# Patient Record
Sex: FEMALE | Race: BLACK OR AFRICAN AMERICAN | NOT HISPANIC OR LATINO | Employment: OTHER | URBAN - METROPOLITAN AREA
[De-identification: names, ages, dates, MRNs, and addresses within clinical notes are randomized per-mention and may not be internally consistent; named-entity substitution may affect disease eponyms.]

---

## 2024-05-11 ENCOUNTER — HOSPITAL ENCOUNTER (INPATIENT)
Facility: HOSPITAL | Age: 64
LOS: 2 days | Discharge: HOME | DRG: 390 | End: 2024-05-13
Attending: EMERGENCY MEDICINE | Admitting: SURGERY
Payer: MEDICAID

## 2024-05-11 DIAGNOSIS — K56.609 SMALL BOWEL OBSTRUCTION: Primary | ICD-10-CM

## 2024-05-11 DIAGNOSIS — R10.84 GENERALIZED ABDOMINAL PAIN: ICD-10-CM

## 2024-05-11 DIAGNOSIS — K56.601 COMPLETE INTESTINAL OBSTRUCTION, UNSPECIFIED CAUSE: ICD-10-CM

## 2024-05-11 PROBLEM — E78.5 HYPERLIPIDEMIA: Status: ACTIVE | Noted: 2024-05-11

## 2024-05-11 PROBLEM — K29.70 GASTRITIS: Status: ACTIVE | Noted: 2024-05-11

## 2024-05-11 PROBLEM — I10 ESSENTIAL HYPERTENSION: Status: ACTIVE | Noted: 2024-05-11

## 2024-05-11 PROBLEM — K29.70 GASTRITIS: Status: RESOLVED | Noted: 2024-05-11 | Resolved: 2024-05-11

## 2024-05-11 PROBLEM — K21.9 GERD (GASTROESOPHAGEAL REFLUX DISEASE): Status: ACTIVE | Noted: 2024-05-11

## 2024-05-11 LAB
ALBUMIN SERPL BCP-MCNC: 2.5 G/DL (ref 3.5–5.2)
ALP SERPL-CCNC: 78 U/L (ref 55–135)
ALT SERPL W/O P-5'-P-CCNC: 9 U/L (ref 10–44)
ANION GAP SERPL CALC-SCNC: 9 MMOL/L (ref 8–16)
AST SERPL-CCNC: 14 U/L (ref 10–40)
BACTERIA #/AREA URNS HPF: NORMAL /HPF
BASOPHILS # BLD AUTO: 0.04 K/UL (ref 0–0.2)
BASOPHILS NFR BLD: 1 % (ref 0–1.9)
BILIRUB SERPL-MCNC: 0.4 MG/DL (ref 0.1–1)
BILIRUB UR QL STRIP: NEGATIVE
BUN SERPL-MCNC: 19 MG/DL (ref 8–23)
CALCIUM SERPL-MCNC: 8.3 MG/DL (ref 8.7–10.5)
CHLORIDE SERPL-SCNC: 103 MMOL/L (ref 95–110)
CLARITY UR: CLEAR
CO2 SERPL-SCNC: 26 MMOL/L (ref 23–29)
COLOR UR: YELLOW
CREAT SERPL-MCNC: 0.9 MG/DL (ref 0.5–1.4)
DIFFERENTIAL METHOD BLD: ABNORMAL
EOSINOPHIL # BLD AUTO: 0 K/UL (ref 0–0.5)
EOSINOPHIL NFR BLD: 0.5 % (ref 0–8)
ERYTHROCYTE [DISTWIDTH] IN BLOOD BY AUTOMATED COUNT: 14.1 % (ref 11.5–14.5)
EST. GFR  (NO RACE VARIABLE): >60 ML/MIN/1.73 M^2
GLUCOSE SERPL-MCNC: 89 MG/DL (ref 70–110)
GLUCOSE UR QL STRIP: NEGATIVE
HCT VFR BLD AUTO: 34.7 % (ref 37–48.5)
HGB BLD-MCNC: 10.8 G/DL (ref 12–16)
HGB UR QL STRIP: NEGATIVE
HYALINE CASTS #/AREA URNS LPF: 0 /LPF
IMM GRANULOCYTES # BLD AUTO: 0.01 K/UL (ref 0–0.04)
IMM GRANULOCYTES NFR BLD AUTO: 0.2 % (ref 0–0.5)
KETONES UR QL STRIP: ABNORMAL
LACTATE SERPL-SCNC: 0.9 MMOL/L (ref 0.5–2.2)
LEUKOCYTE ESTERASE UR QL STRIP: NEGATIVE
LIPASE SERPL-CCNC: 3 U/L (ref 4–60)
LYMPHOCYTES # BLD AUTO: 1 K/UL (ref 1–4.8)
LYMPHOCYTES NFR BLD: 22.7 % (ref 18–48)
MCH RBC QN AUTO: 27.8 PG (ref 27–31)
MCHC RBC AUTO-ENTMCNC: 31.1 G/DL (ref 32–36)
MCV RBC AUTO: 89 FL (ref 82–98)
MICROSCOPIC COMMENT: NORMAL
MONOCYTES # BLD AUTO: 0.8 K/UL (ref 0.3–1)
MONOCYTES NFR BLD: 20 % (ref 4–15)
NEUTROPHILS # BLD AUTO: 2.3 K/UL (ref 1.8–7.7)
NEUTROPHILS NFR BLD: 55.6 % (ref 38–73)
NITRITE UR QL STRIP: NEGATIVE
NRBC BLD-RTO: 0 /100 WBC
PH UR STRIP: 6 [PH] (ref 5–8)
PLATELET # BLD AUTO: 581 K/UL (ref 150–450)
PMV BLD AUTO: 8.5 FL (ref 9.2–12.9)
POTASSIUM SERPL-SCNC: 3.5 MMOL/L (ref 3.5–5.1)
PROT SERPL-MCNC: 6 G/DL (ref 6–8.4)
PROT UR QL STRIP: ABNORMAL
RBC # BLD AUTO: 3.88 M/UL (ref 4–5.4)
RBC #/AREA URNS HPF: 0 /HPF (ref 0–4)
SODIUM SERPL-SCNC: 138 MMOL/L (ref 136–145)
SP GR UR STRIP: >1.03 (ref 1–1.03)
SQUAMOUS #/AREA URNS HPF: 3 /HPF
URN SPEC COLLECT METH UR: ABNORMAL
UROBILINOGEN UR STRIP-ACNC: NEGATIVE EU/DL
WBC # BLD AUTO: 4.19 K/UL (ref 3.9–12.7)
WBC #/AREA URNS HPF: 1 /HPF (ref 0–5)

## 2024-05-11 PROCEDURE — G0378 HOSPITAL OBSERVATION PER HR: HCPCS

## 2024-05-11 PROCEDURE — 85025 COMPLETE CBC W/AUTO DIFF WBC: CPT | Performed by: EMERGENCY MEDICINE

## 2024-05-11 PROCEDURE — 83605 ASSAY OF LACTIC ACID: CPT | Performed by: EMERGENCY MEDICINE

## 2024-05-11 PROCEDURE — 25000003 PHARM REV CODE 250

## 2024-05-11 PROCEDURE — 63600175 PHARM REV CODE 636 W HCPCS: Performed by: EMERGENCY MEDICINE

## 2024-05-11 PROCEDURE — 25000003 PHARM REV CODE 250: Performed by: EMERGENCY MEDICINE

## 2024-05-11 PROCEDURE — 96375 TX/PRO/DX INJ NEW DRUG ADDON: CPT

## 2024-05-11 PROCEDURE — 25000003 PHARM REV CODE 250: Performed by: SURGERY

## 2024-05-11 PROCEDURE — 96365 THER/PROPH/DIAG IV INF INIT: CPT

## 2024-05-11 PROCEDURE — 63600175 PHARM REV CODE 636 W HCPCS: Performed by: SURGERY

## 2024-05-11 PROCEDURE — 81000 URINALYSIS NONAUTO W/SCOPE: CPT | Performed by: EMERGENCY MEDICINE

## 2024-05-11 PROCEDURE — 83690 ASSAY OF LIPASE: CPT | Performed by: EMERGENCY MEDICINE

## 2024-05-11 PROCEDURE — 99222 1ST HOSP IP/OBS MODERATE 55: CPT | Mod: ,,, | Performed by: SURGERY

## 2024-05-11 PROCEDURE — 25500020 PHARM REV CODE 255: Performed by: EMERGENCY MEDICINE

## 2024-05-11 PROCEDURE — 99285 EMERGENCY DEPT VISIT HI MDM: CPT | Mod: 25

## 2024-05-11 PROCEDURE — 11000001 HC ACUTE MED/SURG PRIVATE ROOM

## 2024-05-11 PROCEDURE — 80053 COMPREHEN METABOLIC PANEL: CPT | Performed by: EMERGENCY MEDICINE

## 2024-05-11 PROCEDURE — 96361 HYDRATE IV INFUSION ADD-ON: CPT

## 2024-05-11 RX ORDER — MORPHINE SULFATE 4 MG/ML
4 INJECTION, SOLUTION INTRAMUSCULAR; INTRAVENOUS EVERY 4 HOURS PRN
Status: DISCONTINUED | OUTPATIENT
Start: 2024-05-11 | End: 2024-05-13 | Stop reason: HOSPADM

## 2024-05-11 RX ORDER — MORPHINE SULFATE 4 MG/ML
4 INJECTION, SOLUTION INTRAMUSCULAR; INTRAVENOUS
Status: COMPLETED | OUTPATIENT
Start: 2024-05-11 | End: 2024-05-11

## 2024-05-11 RX ORDER — SODIUM CHLORIDE 0.9 % (FLUSH) 0.9 %
10 SYRINGE (ML) INJECTION
Status: DISCONTINUED | OUTPATIENT
Start: 2024-05-11 | End: 2024-05-13 | Stop reason: HOSPADM

## 2024-05-11 RX ORDER — ACETAMINOPHEN 325 MG/1
650 TABLET ORAL EVERY 8 HOURS PRN
Status: DISCONTINUED | OUTPATIENT
Start: 2024-05-11 | End: 2024-05-13 | Stop reason: HOSPADM

## 2024-05-11 RX ORDER — TALC
6 POWDER (GRAM) TOPICAL NIGHTLY PRN
Status: DISCONTINUED | OUTPATIENT
Start: 2024-05-11 | End: 2024-05-13 | Stop reason: HOSPADM

## 2024-05-11 RX ORDER — HYDRALAZINE HYDROCHLORIDE 20 MG/ML
10 INJECTION INTRAMUSCULAR; INTRAVENOUS EVERY 6 HOURS PRN
Status: DISCONTINUED | OUTPATIENT
Start: 2024-05-11 | End: 2024-05-13 | Stop reason: HOSPADM

## 2024-05-11 RX ORDER — HYDROCHLOROTHIAZIDE 12.5 MG/1
12.5 TABLET ORAL DAILY
Status: DISCONTINUED | OUTPATIENT
Start: 2024-05-11 | End: 2024-05-11

## 2024-05-11 RX ORDER — ONDANSETRON HYDROCHLORIDE 2 MG/ML
4 INJECTION, SOLUTION INTRAVENOUS
Status: COMPLETED | OUTPATIENT
Start: 2024-05-11 | End: 2024-05-11

## 2024-05-11 RX ORDER — HYDROCHLOROTHIAZIDE 12.5 MG/1
12.5 TABLET ORAL DAILY
COMMUNITY
Start: 2024-04-23

## 2024-05-11 RX ORDER — SUCRALFATE 1 G/10ML
1 SUSPENSION ORAL 3 TIMES DAILY PRN
Status: DISCONTINUED | OUTPATIENT
Start: 2024-05-12 | End: 2024-05-13 | Stop reason: HOSPADM

## 2024-05-11 RX ORDER — ONDANSETRON 8 MG/1
8 TABLET, ORALLY DISINTEGRATING ORAL EVERY 6 HOURS PRN
Status: DISCONTINUED | OUTPATIENT
Start: 2024-05-11 | End: 2024-05-13 | Stop reason: HOSPADM

## 2024-05-11 RX ORDER — DEXTROSE MONOHYDRATE, SODIUM CHLORIDE, AND POTASSIUM CHLORIDE 50; 1.49; 9 G/1000ML; G/1000ML; G/1000ML
INJECTION, SOLUTION INTRAVENOUS CONTINUOUS
Status: DISCONTINUED | OUTPATIENT
Start: 2024-05-11 | End: 2024-05-13 | Stop reason: HOSPADM

## 2024-05-11 RX ORDER — MORPHINE SULFATE 4 MG/ML
2 INJECTION, SOLUTION INTRAMUSCULAR; INTRAVENOUS EVERY 4 HOURS PRN
Status: DISCONTINUED | OUTPATIENT
Start: 2024-05-11 | End: 2024-05-13 | Stop reason: HOSPADM

## 2024-05-11 RX ORDER — ACETAMINOPHEN 650 MG/1
650 SUPPOSITORY RECTAL EVERY 4 HOURS PRN
Status: DISCONTINUED | OUTPATIENT
Start: 2024-05-11 | End: 2024-05-13 | Stop reason: HOSPADM

## 2024-05-11 RX ORDER — SODIUM CHLORIDE 9 MG/ML
1000 INJECTION, SOLUTION INTRAVENOUS
Status: COMPLETED | OUTPATIENT
Start: 2024-05-11 | End: 2024-05-11

## 2024-05-11 RX ORDER — LIDOCAINE HYDROCHLORIDE 10 MG/ML
1 INJECTION, SOLUTION EPIDURAL; INFILTRATION; INTRACAUDAL; PERINEURAL ONCE AS NEEDED
Status: DISCONTINUED | OUTPATIENT
Start: 2024-05-11 | End: 2024-05-13 | Stop reason: HOSPADM

## 2024-05-11 RX ORDER — HYDROCHLOROTHIAZIDE 12.5 MG/1
12.5 TABLET ORAL DAILY
Status: DISCONTINUED | OUTPATIENT
Start: 2024-05-11 | End: 2024-05-13 | Stop reason: HOSPADM

## 2024-05-11 RX ORDER — PROMETHAZINE HYDROCHLORIDE 25 MG/1
25 SUPPOSITORY RECTAL EVERY 6 HOURS PRN
Status: DISCONTINUED | OUTPATIENT
Start: 2024-05-11 | End: 2024-05-13 | Stop reason: HOSPADM

## 2024-05-11 RX ADMIN — SODIUM CHLORIDE 1000 ML: 9 INJECTION, SOLUTION INTRAVENOUS at 08:05

## 2024-05-11 RX ADMIN — DEXTROSE, SODIUM CHLORIDE, AND POTASSIUM CHLORIDE: 5; .9; .15 INJECTION INTRAVENOUS at 12:05

## 2024-05-11 RX ADMIN — MORPHINE SULFATE 4 MG: 4 INJECTION INTRAVENOUS at 12:05

## 2024-05-11 RX ADMIN — IOHEXOL 100 ML: 350 INJECTION, SOLUTION INTRAVENOUS at 09:05

## 2024-05-11 RX ADMIN — PIPERACILLIN SODIUM AND TAZOBACTAM SODIUM 4.5 G: 4; .5 INJECTION, POWDER, FOR SOLUTION INTRAVENOUS at 11:05

## 2024-05-11 RX ADMIN — SODIUM CHLORIDE 1000 ML: 9 INJECTION, SOLUTION INTRAVENOUS at 11:05

## 2024-05-11 RX ADMIN — ONDANSETRON 8 MG: 8 TABLET, ORALLY DISINTEGRATING ORAL at 12:05

## 2024-05-11 RX ADMIN — ONDANSETRON 8 MG: 8 TABLET, ORALLY DISINTEGRATING ORAL at 09:05

## 2024-05-11 RX ADMIN — MORPHINE SULFATE 4 MG: 4 INJECTION INTRAVENOUS at 08:05

## 2024-05-11 RX ADMIN — MORPHINE SULFATE 4 MG: 4 INJECTION INTRAVENOUS at 05:05

## 2024-05-11 RX ADMIN — ONDANSETRON 4 MG: 2 INJECTION INTRAMUSCULAR; INTRAVENOUS at 08:05

## 2024-05-11 RX ADMIN — DEXTROSE, SODIUM CHLORIDE, AND POTASSIUM CHLORIDE: 5; .9; .15 INJECTION INTRAVENOUS at 11:05

## 2024-05-11 RX ADMIN — MORPHINE SULFATE 4 MG: 4 INJECTION INTRAVENOUS at 09:05

## 2024-05-11 NOTE — SUBJECTIVE & OBJECTIVE
Past Medical History:   Diagnosis Date    GERD (gastroesophageal reflux disease)        Past Surgical History:   Procedure Laterality Date    HYSTERECTOMY         Review of patient's allergies indicates:   Allergen Reactions    Naproxen Rash       No current facility-administered medications on file prior to encounter.     Current Outpatient Medications on File Prior to Encounter   Medication Sig    hydroCHLOROthiazide (HYDRODIURIL) 12.5 MG Tab Take 12.5 mg by mouth once daily.     Family History    None       Tobacco Use    Smoking status: Never    Smokeless tobacco: Not on file   Substance and Sexual Activity    Alcohol use: Not on file    Drug use: Never    Sexual activity: Not on file     Review of Systems   Constitutional:  Positive for activity change, appetite change, fatigue and fever.   HENT: Negative.     Respiratory: Negative.  Negative for shortness of breath.    Cardiovascular: Negative.  Negative for chest pain.   Gastrointestinal:  Positive for abdominal pain, diarrhea, nausea and vomiting.   Genitourinary: Negative.    Musculoskeletal:  Positive for back pain.   Skin: Negative.    Neurological: Negative.      Objective:     Vital Signs (Most Recent):  Temp: 98.2 °F (36.8 °C) (05/11/24 0742)  Pulse: 72 (05/11/24 1102)  Resp: 19 (05/11/24 1243)  BP: (!) 121/58 (05/11/24 1102)  SpO2: (!) 94 % (05/11/24 1102) Vital Signs (24h Range):  Temp:  [98.2 °F (36.8 °C)] 98.2 °F (36.8 °C)  Pulse:  [] 72  Resp:  [11-19] 19  SpO2:  [94 %-100 %] 94 %  BP: (116-175)/(58-74) 121/58     Weight: 73.2 kg (161 lb 6.4 oz)  Body mass index is 23.83 kg/m².     Physical Exam  Vitals and nursing note reviewed.   Constitutional:       General: She is not in acute distress.  HENT:      Mouth/Throat:      Mouth: Mucous membranes are moist.      Pharynx: Oropharynx is clear.   Eyes:      Pupils: Pupils are equal, round, and reactive to light.   Cardiovascular:      Rate and Rhythm: Normal rate and regular rhythm.      Heart  sounds: No murmur heard.  Pulmonary:      Effort: Pulmonary effort is normal.      Breath sounds: Normal breath sounds. No wheezing.   Abdominal:      General: Bowel sounds are normal. There is no distension.      Palpations: Abdomen is soft.      Tenderness: There is abdominal tenderness.   Musculoskeletal:         General: Normal range of motion.   Skin:     General: Skin is warm and dry.   Neurological:      General: No focal deficit present.      Mental Status: She is alert and oriented to person, place, and time.          Significant Labs: All pertinent labs within the past 24 hours have been reviewed.  Recent Lab Results         05/11/24  1047   05/11/24  0959   05/11/24  0832        Albumin     2.5       ALP     78       ALT     9       Anion Gap     9       Appearance, UA   Clear         AST     14       Bacteria, UA   None         Baso #     0.04       Basophil %     1.0       Bilirubin (UA)   Negative         BILIRUBIN TOTAL     0.4  Comment: For infants and newborns, interpretation of results should be based  on gestational age, weight and in agreement with clinical  observations.    Premature Infant recommended reference ranges:  Up to 24 hours.............<8.0 mg/dL  Up to 48 hours............<12.0 mg/dL  3-5 days..................<15.0 mg/dL  6-29 days.................<15.0 mg/dL         BUN     19       Calcium     8.3       Chloride     103       CO2     26       Color, UA   Yellow         Creatinine     0.9       Differential Method     Automated       eGFR     >60       Eos #     0.0       Eos %     0.5       Glucose     89       Glucose, UA   Negative         Gran # (ANC)     2.3       Gran %     55.6       Hematocrit     34.7       Hemoglobin     10.8       Hyaline Casts, UA   0         Immature Grans (Abs)     0.01  Comment: Mild elevation in immature granulocytes is non specific and   can be seen in a variety of conditions including stress response,   acute inflammation, trauma and pregnancy.  Correlation with other   laboratory and clinical findings is essential.         Immature Granulocytes     0.2       Ketones, UA   2+         Lactic Acid Level 0.9  Comment: Falsely low lactic acid results can be found in samples   containing >=13.0 mg/dL total bilirubin and/or >=3.5 mg/dL   direct bilirubin.             Leukocyte Esterase, UA   Negative         Lipase     3       Lymph #     1.0       Lymph %     22.7       MCH     27.8       MCHC     31.1       MCV     89       Microscopic Comment   SEE COMMENT  Comment: Other formed elements not mentioned in the report are not   present in the microscopic examination.            Mono #     0.8       Mono %     20.0       MPV     8.5       NITRITE UA   Negative         nRBC     0       Blood, UA   Negative         pH, UA   6.0         Platelet Count     581       Potassium     3.5       PROTEIN TOTAL     6.0       Protein, UA   1+  Comment: Recommend a 24 hour urine protein or a urine   protein/creatinine ratio if globulin induced proteinuria is  clinically suspected.           RBC     3.88       RBC, UA   0         RDW     14.1       Sodium     138       Specific Gravity, UA   >1.030         Specimen UA   Urine, Clean Catch         Squam Epithel, UA   3         UROBILINOGEN UA   Negative         WBC, UA   1         WBC     4.19               Significant Imaging: I have reviewed all pertinent imaging results/findings within the past 24 hours.

## 2024-05-11 NOTE — PHARMACY MED REC
"Admission Medication History     The home medication history was taken by Sy Burden.    You may go to "Admission" then "Reconcile Home Medications" tabs to review and/or act upon these items.     The home medication list has been updated by the Pharmacy department.   Please read ALL comments highlighted in yellow.   Please address this information as you see fit.    Feel free to contact us if you have any questions or require assistance.      Medications listed below were obtained from: Patient/family and Analytic software- [employer]  (Not in a hospital admission)        Sy Burden  [id number]    Current Outpatient Medications on File Prior to Encounter   Medication Sig Dispense Refill Last Dose    hydroCHLOROthiazide (HYDRODIURIL) 12.5 MG Tab Take 12.5 mg by mouth once daily.   Past Week                         .          "

## 2024-05-11 NOTE — ED PROVIDER NOTES
SCRIBE #1 NOTE: I, Shelley Mishra, am scribing for, and in the presence of, Jalyn Mayberry MD. I have scribed the entire note.       History     Chief Complaint   Patient presents with    Abdominal Pain     Pt c/o generalized abdominal pain with radiation to back. Pt c/o intermittent nausea and vomiting, threw up twice this morning, states dx with gastritis in 2017. LBM this morning states it was diarrhea.      Review of patient's allergies indicates:   Allergen Reactions    Naproxen Rash         History of Present Illness     HPI    5/11/2024, 8:07 AM  History obtained from the patient      History of Present Illness: Claudia Chris is a 63 y.o. female patient with PMHx of gastritis who presents to the Emergency Department for evaluation of abdominal pain which onset gradually 4 days ago. Pt reports pain is different from previous gastritis flare-ups. Pt reports pain is worsening. Symptoms are intermittent and moderate in severity. No mitigating or exacerbating factors reported. Associated sxs include N/V/D, SOB, and fever. Patient denies any CP, HA, congestion, cough, dysuria, neck pain, and all other sxs at this time. Prior Tx includes none. No further complaints or concerns at this time.       Arrival mode: Personal vehicle    PCP: No primary care provider on file.        Past Medical History:  Past Medical History:   Diagnosis Date    GERD (gastroesophageal reflux disease)        Past Surgical History:  Past Surgical History:   Procedure Laterality Date    HYSTERECTOMY           Family History:  No family history on file.    Social History:  Social History     Tobacco Use    Smoking status: Never    Smokeless tobacco: Not on file   Substance and Sexual Activity    Alcohol use: Not on file    Drug use: Never    Sexual activity: Not on file        Review of Systems     Review of Systems   Constitutional:  Positive for fever.   HENT:  Negative for congestion and sore throat.    Respiratory:  Positive for shortness  of breath. Negative for cough.    Cardiovascular:  Negative for chest pain.   Gastrointestinal:  Positive for abdominal pain, diarrhea, nausea and vomiting.   Genitourinary:  Negative for dysuria.   Musculoskeletal:  Negative for back pain and neck pain.   Skin:  Negative for rash.   Neurological:  Negative for weakness and headaches.   Hematological:  Does not bruise/bleed easily.   All other systems reviewed and are negative.       Physical Exam     Initial Vitals [05/11/24 0742]   BP Pulse Resp Temp SpO2   116/62 104 18 98.2 °F (36.8 °C) 99 %      MAP       --          Physical Exam  Nursing Notes and Vital Signs Reviewed.  Constitutional: Patient is in no acute distress. Well-developed and well-nourished.  Head: Atraumatic. Normocephalic.  Eyes: PERRL. EOM intact. Conjunctivae are not pale. No scleral icterus.  ENT: Mucous membranes are moist. Oropharynx is clear and symmetric.    Neck: Supple. Full ROM. No lymphadenopathy.  Cardiovascular: Regular rate. Regular rhythm. No murmurs, rubs, or gallops. Distal pulses are 2+ and symmetric.  Pulmonary/Chest: No respiratory distress. Clear to auscultation bilaterally. No wheezing or rales.  Abdominal: Soft and non-distended. Generalized abdominal tenderness.  No rebound, guarding, or rigidity.  Genitourinary: No CVA tenderness  Musculoskeletal: Moves all extremities. No obvious deformities. No edema.  Skin: Warm and dry.  Neurological:  Alert, awake, and appropriate.  Normal speech.  No acute focal neurological deficits are appreciated.  Psychiatric: Normal affect. Good eye contact. Appropriate in content.     ED Course   Critical Care    Date/Time: 5/11/2024 10:33 AM    Performed by: Jalyn Mayberry MD  Authorized by: Jalyn Mayberry MD  Direct patient critical care time: 20 minutes  Additional history critical care time: 15 minutes  Ordering / reviewing critical care time: 5 minutes  Documentation critical care time: 5 minutes  Consulting other physicians  "critical care time: 5 minutes  Total critical care time (exclusive of procedural time) : 50 minutes  Critical care time was exclusive of separately billable procedures and treating other patients and teaching time.  Critical care was necessary to treat or prevent imminent or life-threatening deterioration of the following conditions: bowel obstruction.  Critical care was time spent personally by me on the following activities: blood draw for specimens, development of treatment plan with patient or surrogate, discussions with consultants, interpretation of cardiac output measurements, evaluation of patient's response to treatment, obtaining history from patient or surrogate, examination of patient, ordering and performing treatments and interventions, ordering and review of laboratory studies, pulse oximetry, ordering and review of radiographic studies, re-evaluation of patient's condition and review of old charts.        ED Vital Signs:  Vitals:    05/11/24 0740 05/11/24 0742 05/11/24 0757 05/11/24 0830   BP:  116/62 138/65    Pulse:  104 94    Resp:  18 15 16   Temp:  98.2 °F (36.8 °C)     TempSrc:  Oral     SpO2:  99% 100%    Weight: 73.2 kg (161 lb 6.4 oz)      Height: 5' 9" (1.753 m)       05/11/24 0938 05/11/24 1002 05/11/24 1102   BP: (!) 175/74 138/63 (!) 121/58   Pulse: 76 74 72   Resp: 16 11 16   Temp:      TempSrc:      SpO2: 97% 99% (!) 94%   Weight:      Height:          Abnormal Lab Results:  Labs Reviewed   CBC W/ AUTO DIFFERENTIAL - Abnormal; Notable for the following components:       Result Value    RBC 3.88 (*)     Hemoglobin 10.8 (*)     Hematocrit 34.7 (*)     MCHC 31.1 (*)     Platelets 581 (*)     MPV 8.5 (*)     Mono % 20.0 (*)     All other components within normal limits   COMPREHENSIVE METABOLIC PANEL - Abnormal; Notable for the following components:    Calcium 8.3 (*)     Albumin 2.5 (*)     ALT 9 (*)     All other components within normal limits   LIPASE - Abnormal; Notable for the " following components:    Lipase 3 (*)     All other components within normal limits   URINALYSIS, REFLEX TO URINE CULTURE - Abnormal; Notable for the following components:    Specific Gravity, UA >1.030 (*)     Protein, UA 1+ (*)     Ketones, UA 2+ (*)     All other components within normal limits    Narrative:     Specimen Source->Urine   LACTIC ACID, PLASMA   URINALYSIS MICROSCOPIC    Narrative:     Specimen Source->Urine        All Lab Results:  Results for orders placed or performed during the hospital encounter of 05/11/24   CBC W/ AUTO DIFFERENTIAL   Result Value Ref Range    WBC 4.19 3.90 - 12.70 K/uL    RBC 3.88 (L) 4.00 - 5.40 M/uL    Hemoglobin 10.8 (L) 12.0 - 16.0 g/dL    Hematocrit 34.7 (L) 37.0 - 48.5 %    MCV 89 82 - 98 fL    MCH 27.8 27.0 - 31.0 pg    MCHC 31.1 (L) 32.0 - 36.0 g/dL    RDW 14.1 11.5 - 14.5 %    Platelets 581 (H) 150 - 450 K/uL    MPV 8.5 (L) 9.2 - 12.9 fL    Immature Granulocytes 0.2 0.0 - 0.5 %    Gran # (ANC) 2.3 1.8 - 7.7 K/uL    Immature Grans (Abs) 0.01 0.00 - 0.04 K/uL    Lymph # 1.0 1.0 - 4.8 K/uL    Mono # 0.8 0.3 - 1.0 K/uL    Eos # 0.0 0.0 - 0.5 K/uL    Baso # 0.04 0.00 - 0.20 K/uL    nRBC 0 0 /100 WBC    Gran % 55.6 38.0 - 73.0 %    Lymph % 22.7 18.0 - 48.0 %    Mono % 20.0 (H) 4.0 - 15.0 %    Eosinophil % 0.5 0.0 - 8.0 %    Basophil % 1.0 0.0 - 1.9 %    Differential Method Automated    Comp. Metabolic Panel   Result Value Ref Range    Sodium 138 136 - 145 mmol/L    Potassium 3.5 3.5 - 5.1 mmol/L    Chloride 103 95 - 110 mmol/L    CO2 26 23 - 29 mmol/L    Glucose 89 70 - 110 mg/dL    BUN 19 8 - 23 mg/dL    Creatinine 0.9 0.5 - 1.4 mg/dL    Calcium 8.3 (L) 8.7 - 10.5 mg/dL    Total Protein 6.0 6.0 - 8.4 g/dL    Albumin 2.5 (L) 3.5 - 5.2 g/dL    Total Bilirubin 0.4 0.1 - 1.0 mg/dL    Alkaline Phosphatase 78 55 - 135 U/L    AST 14 10 - 40 U/L    ALT 9 (L) 10 - 44 U/L    eGFR >60 >60 mL/min/1.73 m^2    Anion Gap 9 8 - 16 mmol/L   Lipase   Result Value Ref Range    Lipase 3 (L) 4  - 60 U/L   Urinalysis, Reflex to Urine Culture Urine, Clean Catch    Specimen: Urine   Result Value Ref Range    Specimen UA Urine, Clean Catch     Color, UA Yellow Yellow, Straw, Mariela    Appearance, UA Clear Clear    pH, UA 6.0 5.0 - 8.0    Specific Gravity, UA >1.030 (A) 1.005 - 1.030    Protein, UA 1+ (A) Negative    Glucose, UA Negative Negative    Ketones, UA 2+ (A) Negative    Bilirubin (UA) Negative Negative    Occult Blood UA Negative Negative    Nitrite, UA Negative Negative    Urobilinogen, UA Negative <2.0 EU/dL    Leukocytes, UA Negative Negative   Lactic acid, plasma   Result Value Ref Range    Lactate (Lactic Acid) 0.9 0.5 - 2.2 mmol/L   Urinalysis Microscopic   Result Value Ref Range    RBC, UA 0 0 - 4 /hpf    WBC, UA 1 0 - 5 /hpf    Bacteria None None-Occ /hpf    Squam Epithel, UA 3 /hpf    Hyaline Casts, UA 0 0-1/lpf /lpf    Microscopic Comment SEE COMMENT          Imaging Results:  Imaging Results              CT Abdomen Pelvis With IV Contrast NO Oral Contrast (Final result)  Result time 05/11/24 09:57:25      Final result by Teddy Guo MD (05/11/24 09:57:25)                   Impression:      Focal small bowel wall thickening with associated surrounding fat stranding in the lower central abdomen resulting in obstruction with upstream dilatation of the proximal small bowel.  Additional short-segment of small bowel wall thickening in the central abdomen.  Surrounding inflammatory changes including fat stranding and lymphadenopathy at these levels.    Findings relayed to Dr. Mayberry via Epic secure chat at 09:56 on 05/11/2024.      Electronically signed by: Teddy Guo  Date:    05/11/2024  Time:    09:57               Narrative:    EXAMINATION:  CT ABDOMEN PELVIS WITH IV CONTRAST    CLINICAL HISTORY:  Abdominal abscess/infection suspected;Nausea/vomiting;    TECHNIQUE:  Low dose axial images, sagittal and coronal reformations were obtained from the lung bases to the pubic symphysis  following the IV administration of 100 mL of Omnipaque 350 .  Oral contrast was not given.    All CT scans at this location are performed using dose optimization techniques including the following: Automated exposure control; adjustment of the mA and/or kv; use of iterative reconstruction technique.    COMPARISON:  None.    FINDINGS:  Liver is normal in size, contour, and attenuation.  No suspicious focal lesion.  Portal venous system is patent.    Gallbladder and biliary tree are unremarkable.  No cholelithiasis or ductal dilatation.    Spleen is normal in size and enhancement.  No focal lesion.    Pancreas is normal in size, contour, and enhancement.  No focal lesion or ductal dilatation.    Adrenal glands are unremarkable.    Kidneys are normal in size and enhancement.  No suspicious focal lesion, urolithiasis, or hydroureteronephrosis.    There is focal small bowel wall thickening with associated surrounding fat stranding in the lower central abdomen.  There is associated obstruction with upstream dilatation of the proximal small bowel.  Additional short-segment of small bowel wall thickening noted within the central abdomen with surrounding fat stranding and prominent mesenteric lymph nodes.  The distal small bowel and colon are decompressed.    Urinary bladder is unremarkable.    Uterus is surgically absent.  No significant adnexal abnormality seen.    Scattered atherosclerosis noted.  Major vessels of the abdomen and pelvis are otherwise unremarkable.    Prominent mesenteric lymph nodes noted at the level of the inflamed appearing small bowel.  No other suspicious lymphadenopathy.    No significant ascites, pneumoperitoneum, or peritoneal implant.    No acute bony abnormality.  No aggressive lytic or blastic lesion.  Mild degenerative changes noted in the spine.    Visualized lower lungs are clear.                                       No EKG ordered.            The Emergency Provider reviewed the vital signs  and test results, which are outlined above.     ED Discussion       10:36 AM: Discussed case with Dr. Dunne (Surgical Oncology). Dr. Dunne agrees with current care and management of pt and accepts admission.   Admitting Service: Surgical Oncology  Admitting Physician: Dr. Dunne  Admit to: [facility]     10:36 AM: Re-evaluated pt. I have discussed test results, shared treatment plan, and the need for admission with patient and family at bedside. Pt and family express understanding at this time and agree with all information. All questions answered. Pt and family have no further questions or concerns at this time. Pt is ready for admit.      Medical Decision Making  Notify by radiology that patient has a bowel obstruction.  Surgery has been consulted but she is actually on the floor right now dealing with a perforated bowel and will be down as soon as possible.  Patient's vitals have been stable.  She has some crampy abdominal pain but has no vomiting in the emergency room.  Will continue to monitor closely.  Zosyn ordered, maintenance fluids and patient is still NPO.    Went to notify pt about bowel obstruction and apparently patient has had 3 bowel obstruction in the last 3 years.  She was actually admitted at [facility] 2 days ago on the 9th for bowel obstruction had an NG-tube.  She reports she signed out AMA yesterday because she felt she was not getting good care.      Amount and/or Complexity of Data Reviewed  Labs: ordered. Decision-making details documented in ED Course.  Radiology: ordered. Decision-making details documented in ED Course.    Risk  Prescription drug management.  Decision regarding hospitalization.  Risk Details: Differential diagnosis;  Gastroenteritis, Bowel obstruction, Colitis, Diverticulitis, Cholecystitis, Appendicitis, Perforated bowel, Herniation, Infectious etiology, UTI, Pyelonephritis,  Biliary obstruction, kidney stone       Critical Care  Total time providing  critical care: 50 minutes                ED Medication(s):  Medications   LIDOcaine (PF) 10 mg/ml (1%) injection 10 mg (has no administration in time range)   sodium chloride 0.9% flush 10 mL (has no administration in time range)   ondansetron disintegrating tablet 8 mg (has no administration in time range)   melatonin tablet 6 mg (has no administration in time range)   acetaminophen tablet 650 mg (has no administration in time range)   dextrose 5 % and 0.9 % NaCl with KCl 20 mEq infusion (has no administration in time range)   acetaminophen suppository 650 mg (has no administration in time range)   morphine injection 2 mg (has no administration in time range)   morphine injection 4 mg (has no administration in time range)   promethazine suppository 25 mg (has no administration in time range)   sodium chloride 0.9% bolus 1,000 mL 1,000 mL (0 mLs Intravenous Stopped 5/11/24 0930)   ondansetron injection 4 mg (4 mg Intravenous Given 5/11/24 0830)   morphine injection 4 mg (4 mg Intravenous Given 5/11/24 0830)   iohexoL (OMNIPAQUE 350) injection 100 mL (100 mLs Intravenous Given 5/11/24 0926)   piperacillin-tazobactam (ZOSYN) 4.5 g in dextrose 5 % in water (D5W) 100 mL IVPB (MB+) (0 g Intravenous Stopped 5/11/24 1131)   0.9%  NaCl infusion (1,000 mLs Intravenous New Bag 5/11/24 1100)       New Prescriptions    No medications on file               Scribe Attestation:   Scribe #1: I performed the above scribed service and the documentation accurately describes the services I performed. I attest to the accuracy of the note.     Attending:   Physician Attestation Statement for Scribe #1: I, Jalyn Mayberry MD, personally performed the services described in this documentation, as scribed by Shelley Mishra, in my presence, and it is both accurate and complete.           Clinical Impression       ICD-10-CM ICD-9-CM   1. Generalized abdominal pain  R10.84 789.07   2. Complete intestinal obstruction, unspecified cause  K56.601  560.9   3. Small bowel obstruction  K56.609 560.9       Disposition:   Disposition: Placed in Observation  Condition: Serious         Jalyn Mayberry MD  05/11/24 8407

## 2024-05-11 NOTE — PLAN OF CARE
Discussed poc with pt, pt verbalized understanding    Purposeful rounding every 2hours    VS wnl  Fall precautions in place, remains injury free  Pt denies c/o nausea  Pain  under control with PRN meds    IVFs- D5 NS K 20mEq @100mL  Accurate I&Os  Bed locked at lowest position  Call light within reach    Chart check complete  Will cont with POC
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
## 2024-05-11 NOTE — ASSESSMENT & PLAN NOTE
CT abdomen reveal bowel obstruction   Patient NPO   NGT ordered per primary team   IV Abx- Zosyn   Gen surgery- Dr. Dunne

## 2024-05-11 NOTE — ED NOTES
Pt refuses NGT placement. Educated pt on the importance of the NGT, pt still refused. Pt is requesting to see the MD. MD Uzair notified.

## 2024-05-11 NOTE — ASSESSMENT & PLAN NOTE
Chronic, uncontrolled. Latest blood pressure and vitals reviewed-     Temp:  [98.2 °F (36.8 °C)]   Pulse:  []   Resp:  [11-19]   BP: (116-175)/(58-74)   SpO2:  [94 %-100 %] .   Home meds for hypertension were reviewed and noted below.   Hypertension Medications               hydroCHLOROthiazide (HYDRODIURIL) 12.5 MG Tab Take 12.5 mg by mouth once daily.            While in the hospital, will manage blood pressure as follows; Continue home antihypertensive regimen    Will utilize p.r.n. blood pressure medication only if patient's blood pressure greater than 180/110 and she develops symptoms such as worsening chest pain or shortness of breath.    Patient reports she only take HCTZ 12.5mg PO daily for HTN, states she hs not been taking the Lisinopril

## 2024-05-12 PROCEDURE — 63600175 PHARM REV CODE 636 W HCPCS: Performed by: SURGERY

## 2024-05-12 PROCEDURE — 25500020 PHARM REV CODE 255: Performed by: SURGERY

## 2024-05-12 PROCEDURE — 11000001 HC ACUTE MED/SURG PRIVATE ROOM

## 2024-05-12 PROCEDURE — 99232 SBSQ HOSP IP/OBS MODERATE 35: CPT | Mod: ,,, | Performed by: SURGERY

## 2024-05-12 PROCEDURE — 25000003 PHARM REV CODE 250: Performed by: SURGERY

## 2024-05-12 RX ADMIN — DEXTROSE, SODIUM CHLORIDE, AND POTASSIUM CHLORIDE: 5; .9; .15 INJECTION INTRAVENOUS at 10:05

## 2024-05-12 RX ADMIN — DIATRIZOATE MEGLUMINE AND DIATRIZOATE SODIUM 120 ML: 600; 100 SOLUTION ORAL; RECTAL at 01:05

## 2024-05-12 RX ADMIN — MORPHINE SULFATE 4 MG: 4 INJECTION INTRAVENOUS at 02:05
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
## 2024-05-12 NOTE — ASSESSMENT & PLAN NOTE
We had a lengthy discussion about the management of small-bowel obstructions namely the role for NG tube decompression.  She currently does not want an NG-tube and has had them several times before.  I did discuss with her that there are some concerning findings on her CT scan and then she has had 3 bowel obstructions this year so far all of which I do think would merit diagnostic laparoscopy at least.  At this time she has not interested in surgery and I think it is reasonable to hold off on that pending results of a small-bowel follow-through.  I discussed with her that small-bowel follow-through was normal we should we could certainly discuss an operation or not.  However if it is abnormal I certainly think it merits diagnostic laparoscopy on Monday.     -- NPO + IVF  -- SBFT tomorrow  -- pain control   -- Appreciate [facility] assistance w/ medical management of co morbidities

## 2024-05-12 NOTE — SUBJECTIVE & OBJECTIVE
Interval History: AFVSS.  PRIYA.  Passing flatus and having bowel function.  Feeling well.     Medications:  Continuous Infusions:   dextrose 5 % and 0.9 % NaCl with KCl 20 mEq   Intravenous Continuous 100 mL/hr at 05/12/24 1044 New Bag at 05/12/24 1044     Scheduled Meds:   hydroCHLOROthiazide  12.5 mg Oral Daily     PRN Meds:  Current Facility-Administered Medications:     acetaminophen, 650 mg, Rectal, Q4H PRN    acetaminophen, 650 mg, Oral, Q8H PRN    hydrALAZINE, 10 mg, Intravenous, Q6H PRN    LIDOcaine (PF) 10 mg/ml (1%), 1 mL, Intradermal, Once PRN    melatonin, 6 mg, Oral, Nightly PRN    morphine, 2 mg, Intravenous, Q4H PRN    morphine, 4 mg, Intravenous, Q4H PRN    ondansetron, 8 mg, Oral, Q6H PRN    promethazine, 25 mg, Rectal, Q6H PRN    sodium chloride 0.9%, 10 mL, Intravenous, PRN    sucralfate, 1 g, Oral, TID PRN     Review of patient's allergies indicates:   Allergen Reactions    Naproxen Rash     Objective:     Vital Signs (Most Recent):  Temp: 98.2 °F (36.8 °C) (05/12/24 1137)  Pulse: (!) 59 (05/12/24 1137)  Resp: 18 (05/12/24 1137)  BP: (!) 165/77 (05/12/24 1137)  SpO2: 99 % (05/12/24 1137) Vital Signs (24h Range):  Temp:  [96.6 °F (35.9 °C)-98.2 °F (36.8 °C)] 98.2 °F (36.8 °C)  Pulse:  [54-64] 59  Resp:  [16-18] 18  SpO2:  [98 %-100 %] 99 %  BP: (103-165)/(52-77) 165/77     Weight: 67.5 kg (148 lb 13 oz)  Body mass index is 21.98 kg/m².    Intake/Output - Last 3 Shifts         05/10 0700  05/11 0659 05/11 0700  05/12 0659 05/12 0700  05/13 0659    P.O.  0 120    I.V. (mL/kg)  1554.3 (21.2)     IV Piggyback  1094.1     Total Intake(mL/kg)  2648.4 (36.2) 120 (1.8)    Net  +2648.4 +120                    Physical Exam  Constitutional:       General: She is not in acute distress.     Appearance: Normal appearance.   HENT:      Head: Normocephalic and atraumatic.   Eyes:      Extraocular Movements: Extraocular movements intact.      Conjunctiva/sclera: Conjunctivae normal.   Cardiovascular:      Rate and  Rhythm: Normal rate.   Pulmonary:      Effort: Pulmonary effort is normal.   Abdominal:      General: Abdomen is flat. There is no distension.      Palpations: Abdomen is soft. There is no mass.      Tenderness: There is no abdominal tenderness. There is no guarding or rebound.      Hernia: No hernia is present.   Musculoskeletal:         General: No swelling, tenderness, deformity or signs of injury. Normal range of motion.   Skin:     General: Skin is warm and dry.      Coloration: Skin is not jaundiced.   Neurological:      General: No focal deficit present.      Mental Status: She is alert and oriented to person, place, and time.   Psychiatric:         Mood and Affect: Mood normal.         Behavior: Behavior normal.         Thought Content: Thought content normal.          Significant Labs:  I have reviewed all pertinent lab results within the past 24 hours.  CBC:   Recent Labs   Lab 05/11/24  0832   WBC 4.19   RBC 3.88*   HGB 10.8*   HCT 34.7*   *   MCV 89   MCH 27.8   MCHC 31.1*     BMP:   Recent Labs   Lab 05/11/24  0832   GLU 89      K 3.5      CO2 26   BUN 19   CREATININE 0.9   CALCIUM 8.3*       Significant Diagnostics:  I have reviewed all pertinent imaging results/findings within the past 24 hours.  Imaging Results              CT Abdomen Pelvis With IV Contrast NO Oral Contrast (Final result)  Result time 05/11/24 09:57:25      Final result by Teddy Guo MD (05/11/24 09:57:25)                   Impression:      Focal small bowel wall thickening with associated surrounding fat stranding in the lower central abdomen resulting in obstruction with upstream dilatation of the proximal small bowel.  Additional short-segment of small bowel wall thickening in the central abdomen.  Surrounding inflammatory changes including fat stranding and lymphadenopathy at these levels.    Findings relayed to Dr. Mayberry via Epic secure chat at 09:56 on 05/11/2024.      Electronically signed  by: Teddy Guo  Date:    05/11/2024  Time:    09:57               Narrative:    EXAMINATION:  CT ABDOMEN PELVIS WITH IV CONTRAST    CLINICAL HISTORY:  Abdominal abscess/infection suspected;Nausea/vomiting;    TECHNIQUE:  Low dose axial images, sagittal and coronal reformations were obtained from the lung bases to the pubic symphysis following the IV administration of 100 mL of Omnipaque 350 .  Oral contrast was not given.    All CT scans at this location are performed using dose optimization techniques including the following: Automated exposure control; adjustment of the mA and/or kv; use of iterative reconstruction technique.    COMPARISON:  None.    FINDINGS:  Liver is normal in size, contour, and attenuation.  No suspicious focal lesion.  Portal venous system is patent.    Gallbladder and biliary tree are unremarkable.  No cholelithiasis or ductal dilatation.    Spleen is normal in size and enhancement.  No focal lesion.    Pancreas is normal in size, contour, and enhancement.  No focal lesion or ductal dilatation.    Adrenal glands are unremarkable.    Kidneys are normal in size and enhancement.  No suspicious focal lesion, urolithiasis, or hydroureteronephrosis.    There is focal small bowel wall thickening with associated surrounding fat stranding in the lower central abdomen.  There is associated obstruction with upstream dilatation of the proximal small bowel.  Additional short-segment of small bowel wall thickening noted within the central abdomen with surrounding fat stranding and prominent mesenteric lymph nodes.  The distal small bowel and colon are decompressed.    Urinary bladder is unremarkable.    Uterus is surgically absent.  No significant adnexal abnormality seen.    Scattered atherosclerosis noted.  Major vessels of the abdomen and pelvis are otherwise unremarkable.    Prominent mesenteric lymph nodes noted at the level of the inflamed appearing small bowel.  No other suspicious  lymphadenopathy.    No significant ascites, pneumoperitoneum, or peritoneal implant.    No acute bony abnormality.  No aggressive lytic or blastic lesion.  Mild degenerative changes noted in the spine.    Visualized lower lungs are clear.
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
## 2024-05-12 NOTE — PLAN OF CARE
[facility] - Med Surg  Discharge Assessment    Primary Care Provider: No, Primary Doctor     Discharge Assessment (most recent)       BRIEF DISCHARGE ASSESSMENT - 05/12/24 1610          Discharge Planning    Assessment Type Discharge Planning Brief Assessment     Resource/Environmental Concerns none     Support Systems Children     Equipment Currently Used at Home none     Current Living Arrangements home     Patient/Family Anticipates Transition to home     Patient/Family Anticipated Services at Transition none     DME Needed Upon Discharge  none     Discharge Plan A Home                     Independent with adls.  No anticipated needs.

## 2024-05-12 NOTE — ASSESSMENT & PLAN NOTE
CT abdomen reveal bowel obstruction   Patient NPO   NGT ordered per primary team -patient refused  IV Abx- Zosyn   Gen surgery- Dr. Dunne

## 2024-05-12 NOTE — ASSESSMENT & PLAN NOTE
Chronic, uncontrolled. Latest blood pressure and vitals reviewed-     Temp:  [96.6 °F (35.9 °C)-98.2 °F (36.8 °C)]   Pulse:  [54-64]   Resp:  [16-18]   BP: (103-165)/(52-77)   SpO2:  [98 %-100 %] .   Home meds for hypertension were reviewed and noted below.   Hypertension Medications               hydroCHLOROthiazide (HYDRODIURIL) 12.5 MG Tab Take 12.5 mg by mouth once daily.            While in the hospital, will manage blood pressure as follows; Continue home antihypertensive regimen    Will utilize p.r.n. blood pressure medication only if patient's blood pressure greater than 180/110 and she develops symptoms such as worsening chest pain or shortness of breath.    Patient reports she only take HCTZ 12.5mg PO daily for HTN, states she hs not been taking the Lisinopril

## 2024-05-12 NOTE — ASSESSMENT & PLAN NOTE
Small-bowel follow-through today still suggestive of bowel obstruction.  Discussed these results with the patient who states that she is actually feeling a.  I did discuss with her the option for proceeding with diagnostic laparoscopy and possible surgery tomorrow versus attempting a clear liquid diet since she is still having bowel function.  She would like to be allowed to drink today and get a repeat x-ray tomorrow.  I did discuss with her that should she develop any nausea or vomiting the recommendation would be to proceed with surgery as there was still evidence on the imaging of obstruction.  She is very reluctant to undergo surgery, likely in part because she is feeling so well.    -- trial CLD today  -- repeat KUB in the AM to eval contrast passage  -- may need diagnostic laparoscopy still but will see how she does  -- pain control   -- Appreciate [facility] assistance w/ medical management of co morbidities
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
## 2024-05-12 NOTE — SUBJECTIVE & OBJECTIVE
Interval History:  Follow up for small-bowel obstruction.  Patient denies any abdominal pain at this time.  She states she is feeling well this morning.  Awaiting evaluation by Dr. Dunne for further management.  Patient states she is passing gas but has not had any bowel movements.    Review of Systems  Objective:     Vital Signs (Most Recent):  Temp: 98.2 °F (36.8 °C) (05/12/24 1137)  Pulse: (!) 59 (05/12/24 1137)  Resp: 18 (05/12/24 1137)  BP: (!) 165/77 (05/12/24 1137)  SpO2: 99 % (05/12/24 1137) Vital Signs (24h Range):  Temp:  [96.6 °F (35.9 °C)-98.2 °F (36.8 °C)] 98.2 °F (36.8 °C)  Pulse:  [54-64] 59  Resp:  [16-18] 18  SpO2:  [98 %-100 %] 99 %  BP: (103-165)/(52-77) 165/77     Weight: 67.5 kg (148 lb 13 oz)  Body mass index is 21.98 kg/m².    Intake/Output Summary (Last 24 hours) at 5/12/2024 1329  Last data filed at 5/12/2024 1038  Gross per 24 hour   Intake 1554.26 ml   Output --   Net 1554.26 ml         Physical Exam  Vitals and nursing note reviewed.   Constitutional:       Appearance: Normal appearance.   HENT:      Head: Normocephalic and atraumatic.      Nose: Nose normal.      Mouth/Throat:      Mouth: Mucous membranes are moist.   Eyes:      Extraocular Movements: Extraocular movements intact.      Conjunctiva/sclera: Conjunctivae normal.   Cardiovascular:      Rate and Rhythm: Normal rate and regular rhythm.      Pulses: Normal pulses.      Heart sounds: Normal heart sounds.   Pulmonary:      Effort: Pulmonary effort is normal.      Breath sounds: Normal breath sounds.   Abdominal:      General: Abdomen is flat.      Palpations: Abdomen is soft.      Comments: Positive hypoactive bowel sounds   Musculoskeletal:      Cervical back: Normal range of motion and neck supple.   Skin:     General: Skin is warm.      Capillary Refill: Capillary refill takes less than 2 seconds.   Neurological:      Mental Status: She is alert and oriented to person, place, and time. Mental status is at baseline.    Psychiatric:         Mood and Affect: Mood normal.         Behavior: Behavior normal.             Significant Labs: All pertinent labs within the past 24 hours have been reviewed.  Recent Lab Results       None            Significant Imaging:   CT Abdomen Pelvis With IV Contrast NO Oral Contrast   Final Result      Focal small bowel wall thickening with associated surrounding fat stranding in the lower central abdomen resulting in obstruction with upstream dilatation of the proximal small bowel.  Additional short-segment of small bowel wall thickening in the central abdomen.  Surrounding inflammatory changes including fat stranding and lymphadenopathy at these levels.      Findings relayed to Dr. Mayberry via Epic secure chat at 09:56 on 05/11/2024.         Electronically signed by: Teddy Guo   Date:    05/11/2024   Time:    09:57      FL Small Bowel Follow Through    (Results Pending)

## 2024-05-12 NOTE — PLAN OF CARE
Problem: Adult Inpatient Plan of Care  Goal: Plan of Care Review  Outcome: Progressing  Goal: Patient-Specific Goal (Individualized)  Outcome: Progressing  Goal: Absence of Hospital-Acquired Illness or Injury  Outcome: Progressing  Goal: Optimal Comfort and Wellbeing  Outcome: Progressing  Goal: Readiness for Transition of Care  Outcome: Progressing       Patient remained free of falls/injury/trauma throughout shift. Patient AAOx4. Neuro status unchanged. No complaints of chest pain or SOB. [id number] continues to infuse. Patient continues to complain of abdominal pain. PRN Morpine administered for pain. Fall risk precautions reviewed and maintained with patient. POC reviewed with patient. Patient verbalized understanding.

## 2024-05-12 NOTE — SUBJECTIVE & OBJECTIVE
No current facility-administered medications on file prior to encounter.     Current Outpatient Medications on File Prior to Encounter   Medication Sig    hydroCHLOROthiazide (HYDRODIURIL) 12.5 MG Tab Take 12.5 mg by mouth once daily.       Review of patient's allergies indicates:   Allergen Reactions    Naproxen Rash       Past Medical History:   Diagnosis Date    GERD (gastroesophageal reflux disease)      Past Surgical History:   Procedure Laterality Date    HYSTERECTOMY       Family History    None       Tobacco Use    Smoking status: Never    Smokeless tobacco: Not on file   Substance and Sexual Activity    Alcohol use: Not on file    Drug use: Never    Sexual activity: Not on file     Review of Systems   Constitutional:  Positive for appetite change. Negative for activity change, chills, fatigue and fever.   Respiratory:  Negative for chest tightness and shortness of breath.    Cardiovascular:  Negative for chest pain.   Gastrointestinal:  Positive for abdominal distention, abdominal pain and nausea. Negative for anal bleeding, blood in stool, constipation, diarrhea and vomiting.   Skin:  Negative for color change and pallor.   Neurological:  Negative for dizziness, seizures, facial asymmetry, numbness and headaches.   Psychiatric/Behavioral:  Negative for agitation and hallucinations. The patient is not nervous/anxious.      Objective:     Vital Signs (Most Recent):  Temp: 97 °F (36.1 °C) (05/11/24 1558)  Pulse: 64 (05/11/24 1558)  Resp: 18 (05/11/24 1705)  BP: (!) 143/64 (05/11/24 1558)  SpO2: 100 % (05/11/24 1558) Vital Signs (24h Range):  Temp:  [97 °F (36.1 °C)-98.2 °F (36.8 °C)] 97 °F (36.1 °C)  Pulse:  [] 64  Resp:  [11-19] 18  SpO2:  [94 %-100 %] 100 %  BP: (116-175)/(58-74) 143/64     Weight: 73.2 kg (161 lb 6.4 oz)  Body mass index is 23.83 kg/m².     Physical Exam  Constitutional:       General: She is not in acute distress.     Appearance: Normal appearance.   HENT:      Head: Normocephalic  and atraumatic.   Eyes:      Extraocular Movements: Extraocular movements intact.      Conjunctiva/sclera: Conjunctivae normal.   Cardiovascular:      Rate and Rhythm: Normal rate.   Pulmonary:      Effort: Pulmonary effort is normal.   Abdominal:      General: Abdomen is flat. There is no distension.      Palpations: Abdomen is soft. There is no mass.      Tenderness: There is no abdominal tenderness. There is no guarding or rebound.      Hernia: No hernia is present.   Musculoskeletal:         General: No swelling, tenderness, deformity or signs of injury. Normal range of motion.   Skin:     General: Skin is warm and dry.      Coloration: Skin is not jaundiced.   Neurological:      General: No focal deficit present.      Mental Status: She is alert and oriented to person, place, and time.   Psychiatric:         Mood and Affect: Mood normal.         Behavior: Behavior normal.         Thought Content: Thought content normal.            I have reviewed all pertinent lab results within the past 24 hours.  CBC:   Recent Labs   Lab 05/11/24  0832   WBC 4.19   RBC 3.88*   HGB 10.8*   HCT 34.7*   *   MCV 89   MCH 27.8   MCHC 31.1*     BMP:   Recent Labs   Lab 05/11/24  0832   GLU 89      K 3.5      CO2 26   BUN 19   CREATININE 0.9   CALCIUM 8.3*       Significant Diagnostics:  I have reviewed all pertinent imaging results/findings within the past 24 hours.  CT: I have reviewed all pertinent results/findings within the past 24 hours and my personal findings are:  Air-fluid levels and fat stranding consistent with small-bowel obstruction  Imaging Results              CT Abdomen Pelvis With IV Contrast NO Oral Contrast (Final result)  Result time 05/11/24 09:57:25      Final result by Teddy Guo MD (05/11/24 09:57:25)                   Impression:      Focal small bowel wall thickening with associated surrounding fat stranding in the lower central abdomen resulting in obstruction with upstream  dilatation of the proximal small bowel.  Additional short-segment of small bowel wall thickening in the central abdomen.  Surrounding inflammatory changes including fat stranding and lymphadenopathy at these levels.    Findings relayed to Dr. Mayberry via Epic secure chat at 09:56 on 05/11/2024.      Electronically signed by: Teddy Guo  Date:    05/11/2024  Time:    09:57               Narrative:    EXAMINATION:  CT ABDOMEN PELVIS WITH IV CONTRAST    CLINICAL HISTORY:  Abdominal abscess/infection suspected;Nausea/vomiting;    TECHNIQUE:  Low dose axial images, sagittal and coronal reformations were obtained from the lung bases to the pubic symphysis following the IV administration of 100 mL of Omnipaque 350 .  Oral contrast was not given.    All CT scans at this location are performed using dose optimization techniques including the following: Automated exposure control; adjustment of the mA and/or kv; use of iterative reconstruction technique.    COMPARISON:  None.    FINDINGS:  Liver is normal in size, contour, and attenuation.  No suspicious focal lesion.  Portal venous system is patent.    Gallbladder and biliary tree are unremarkable.  No cholelithiasis or ductal dilatation.    Spleen is normal in size and enhancement.  No focal lesion.    Pancreas is normal in size, contour, and enhancement.  No focal lesion or ductal dilatation.    Adrenal glands are unremarkable.    Kidneys are normal in size and enhancement.  No suspicious focal lesion, urolithiasis, or hydroureteronephrosis.    There is focal small bowel wall thickening with associated surrounding fat stranding in the lower central abdomen.  There is associated obstruction with upstream dilatation of the proximal small bowel.  Additional short-segment of small bowel wall thickening noted within the central abdomen with surrounding fat stranding and prominent mesenteric lymph nodes.  The distal small bowel and colon are decompressed.    Urinary bladder is  unremarkable.    Uterus is surgically absent.  No significant adnexal abnormality seen.    Scattered atherosclerosis noted.  Major vessels of the abdomen and pelvis are otherwise unremarkable.    Prominent mesenteric lymph nodes noted at the level of the inflamed appearing small bowel.  No other suspicious lymphadenopathy.    No significant ascites, pneumoperitoneum, or peritoneal implant.    No acute bony abnormality.  No aggressive lytic or blastic lesion.  Mild degenerative changes noted in the spine.    Visualized lower lungs are clear.

## 2024-05-13 VITALS
HEART RATE: 68 BPM | DIASTOLIC BLOOD PRESSURE: 62 MMHG | WEIGHT: 148.81 LBS | OXYGEN SATURATION: 97 % | RESPIRATION RATE: 18 BRPM | BODY MASS INDEX: 22.04 KG/M2 | TEMPERATURE: 97 F | SYSTOLIC BLOOD PRESSURE: 139 MMHG | HEIGHT: 69 IN

## 2024-05-13 PROCEDURE — 99238 HOSP IP/OBS DSCHRG MGMT 30/<: CPT | Mod: ,,, | Performed by: SURGERY

## 2024-05-13 NOTE — HOSPITAL COURSE
5/11/2024:  presented w/ abdominal pain, nausea and vomiting.  CT scan c/f SBO.  Pt declined NGT  5/12/2024:  pt states having bowel function.  SBFT obtained showing concerns for persistent SBO.  Pt states she feels well.  We discussed surgery vs trial of clears, and pt elected for trial CLD.   5/13/2024:  pt tolerating CLD.  AM KUB with contrast completely passed and normal caliber small bowel, consistent with resolution of obstruction.  Pt desiring discharge home and appropriate for d/c.  Discussed warning signs and need for return to hospital.

## 2024-05-13 NOTE — PLAN OF CARE
[facility]  Discharge Final Note    Primary Care Provider: No, Primary Doctor    Expected Discharge Date: 5/13/2024    Final Discharge Note (most recent)       Final Note - 05/13/24 1056          Final Note    Assessment Type Final Discharge Note     Anticipated Discharge Disposition Home or Self Care        Post-Acute Status    Discharge Delays None known at this time                     Important Message from Medicare         Discharge home, no home health or dme orders noted.
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
## 2024-05-13 NOTE — PLAN OF CARE
Discussed plan of care with patient and this patient, verbalized understanding.  Patient remains free from injury.  Safety and comfort precautions maintained this shift.   Call light and personal belongings within reach, bed in low position with bed wheels locked.  No s/s of acute distress.   Purposeful rounding continued this shift.  Pain levels are controlled per MD order. IVF infusing.  Cardiac monitoring in place,   Diet orders continued,  Vital signs continued per order.  Patient mobility status remains independent   Chart and orders review completed.   Patient education about care completed.     Problem: Adult Inpatient Plan of Care  Goal: Plan of Care Review  Outcome: Progressing  Goal: Patient-Specific Goal (Individualized)  Outcome: Progressing  Goal: Absence of Hospital-Acquired Illness or Injury  Outcome: Progressing  Goal: Optimal Comfort and Wellbeing  Outcome: Progressing  Goal: Readiness for Transition of Care  Outcome: Progressing

## 2024-05-13 NOTE — NURSING
This patient IV was taken  out by herself. Patient is refusing to have another IV access placed.  Physician informed of this situation and was told to inform the patient the need of fluids. This patient stated that she does not need any fluids she came for her stomach and that's fine so she wants to leave first thing in the am. Patient says fluids is not the focus with her. Staff nurse stress the importance of having IV fluids and the cons of not having fluids and the patient still refused. MD informed.

## 2024-06-20 ENCOUNTER — HOSPITAL ENCOUNTER (EMERGENCY)
Facility: HOSPITAL | Age: 64
Discharge: HOME | End: 2024-06-20
Attending: EMERGENCY MEDICINE
Payer: MEDICAID

## 2024-06-20 VITALS
BODY MASS INDEX: 21.98 KG/M2 | OXYGEN SATURATION: 100 % | RESPIRATION RATE: 18 BRPM | WEIGHT: 148.81 LBS | HEART RATE: 84 BPM | TEMPERATURE: 98 F | DIASTOLIC BLOOD PRESSURE: 70 MMHG | SYSTOLIC BLOOD PRESSURE: 134 MMHG

## 2024-06-20 DIAGNOSIS — S52.502A CLOSED FRACTURE OF DISTAL END OF LEFT RADIUS, UNSPECIFIED FRACTURE MORPHOLOGY, INITIAL ENCOUNTER: Primary | ICD-10-CM
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
## 2024-06-20 PROCEDURE — 29105 APPLICATION LONG ARM SPLINT: CPT | Mod: LT

## 2024-06-20 PROCEDURE — 99283 EMERGENCY DEPT VISIT LOW MDM: CPT | Mod: 25

## 2024-06-20 RX ORDER — HYDROCODONE BITARTRATE AND ACETAMINOPHEN 5; 325 MG/1; MG/1
1 TABLET ORAL EVERY 4 HOURS PRN
Qty: 10 TABLET | Refills: 0 | Status: SHIPPED | OUTPATIENT
Start: 2024-06-20

## 2024-06-20 NOTE — ED PROVIDER NOTES
History      Chief Complaint   Patient presents with    Fall     Fell yesterday while walking to her door. Pt unknown why she fell. 4th fall since April 1st. L Wrist pain/ forearm pain        Review of patient's allergies indicates:   Allergen Reactions    Naproxen Rash        HPI   HPI    6/20/2024, 4:35 PM   History obtained from the patient      History of Present Illness: Claudia Chris is a 63 y.o. female patient who presents to the Emergency Department for left wrist pain since fall yesterday.    No further complaints or concerns at this time.           PCP: No, Primary Doctor       Past Medical History:  Past Medical History:   Diagnosis Date    GERD (gastroesophageal reflux disease)          Past Surgical History:  Past Surgical History:   Procedure Laterality Date    HYSTERECTOMY             Family History:  No family history on file.        Social History:  Social History     Tobacco Use    Smoking status: Never    Smokeless tobacco: Not on file   Substance and Sexual Activity    Alcohol use: Not on file    Drug use: Never    Sexual activity: Not on file       ROS     Review of Systems   Musculoskeletal:  Positive for arthralgias.   Neurological:  Negative for syncope.       Physical Exam      Initial Vitals [06/20/24 1003]   BP Pulse Resp Temp SpO2   137/64 90 18 97.6 °F (36.4 °C) 99 %      MAP       --         Physical Exam  Vital signs and nursing notes reviewed.  Constitutional: Patient is in NAD. Awake and alert. Well-developed and well-nourished.  Head: Atraumatic. Normocephalic.  Eyes:  EOM intact. Conjunctivae nl. No scleral icterus.  ENT: Mucous membranes are moist.   Neck: Supple.  No meningismus  Cardiovascular: Regular rate and rhythm. No murmurs, rubs, or gallops.   Pulmonary/Chest: No respiratory distress. Clear to auscultation bilaterally. No wheezing, rales, or rhonchi.  Abdominal: Soft. Non-distended. No TTP. No rebound, guarding, or rigidity. Good bowel sounds.  Genitourinary: No  CVA tenderness  Musculoskeletal: Moves all extremities. No edema. Left wrist diffuse tenderness, no snuff ttp.  FROM.  Normal sensation, and cap refill less than 2, to fingers x 5.   Skin: Warm and dry.  Neurological: Awake and alert. No acute focal neurological deficits are appreciated.  Psychiatric: Normal affect. Good eye contact. Appropriate in content.      ED Course          Splint Application    Date/Time: 6/20/2024 4:39 PM    Performed by: Ravindra Martinez, Patient Care Assistant  Authorized by: Elsa Jenkins MD  Location details: left wrist  Splint type: sugar tong  Supplies used: Ortho-Glass  Post-procedure: The splinted body part was neurovascularly unchanged following the procedure.  Patient tolerance: Patient tolerated the procedure well with no immediate complications        ED Vital Signs:  Vitals:    06/20/24 1003 06/20/24 1004 06/20/24 1214   BP: 137/64  134/70   Pulse: 90  84   Resp: 18  18   Temp: 97.6 °F (36.4 °C)     TempSrc: Oral     SpO2: 99%  100%   Weight:  67.5 kg (148 lb 13 oz)                  Imaging Results:  Imaging Results              X-Ray Wrist Complete Left (Final result)  Result time 06/20/24 11:00:59      Final result by Guevara Nolasco MD (06/20/24 11:00:59)                   Impression:      Probable nondisplaced distal left radial metaphyseal fracture.      Electronically signed by: Guevara Nolasco MD  Date:    06/20/2024  Time:    11:00               Narrative:    EXAMINATION:  XR WRIST COMPLETE 3 VIEWS LEFT    CLINICAL HISTORY:  Unspecified fall, initial encounter    TECHNIQUE:  Standard radiography performed.    COMPARISON:  None    FINDINGS:  There are subtle findings concerning for an occult nondisplaced fracture of the distal left radial metaphysis.    There are minor degenerative changes of the radiocarpal, intercarpal and 1st metacarpophalangeal joints.                                         The Emergency Provider reviewed the vital signs and test results,  which are outlined above.    ED Discussion             Medication(s) given in the ER:  Medications - No data to display         Follow-up Information       Clinic, [facility]. Schedule an appointment as soon as possible for a visit in 1 week.    Contact information:  [address] [place] [address] [address]  [place] [address] [phone]                                    Medication List        START taking these medications      HYDROcodone-acetaminophen 5-325 mg per tablet  Commonly known as: NORCO  Take 1 tablet by mouth every 4 (four) hours as needed for Pain.            ASK your doctor about these medications      hydroCHLOROthiazide 12.5 MG Tab  Commonly known as: HYDRODIURIL               Where to Get Your Medications        These medications were sent to [facility] - [place], [place] - [address]  [address], [place] [address]      Phone: [phone]   HYDROcodone-acetaminophen 5-325 mg per tablet             Medical Decision Making        All findings were reviewed with the patient/family in detail.   All remaining questions and concerns were addressed at that time.  Patient/family has been counseled regarding the need for follow-up as well as the indication to return to the emergency room should new or worrisome developments occur.        MDM     Amount and/or Complexity of Data Reviewed  Tests in the radiology section of CPT®: ordered and reviewed                   Clinical Impression:        ICD-10-CM ICD-9-CM   1. Closed fracture of distal end of left radius, unspecified fracture morphology, initial encounter  S52.502A 813.42   2. Fall  W19.XXXA E888.9               Carmen Bowman, VIANEY  06/20/24 5737

## 2024-07-30 ENCOUNTER — HOSPITAL ENCOUNTER (EMERGENCY)
Facility: HOSPITAL | Age: 64
Discharge: HOME | End: 2024-07-30
Attending: EMERGENCY MEDICINE
Payer: MEDICAID

## 2024-07-30 VITALS
WEIGHT: 145 LBS | DIASTOLIC BLOOD PRESSURE: 68 MMHG | SYSTOLIC BLOOD PRESSURE: 151 MMHG | HEIGHT: 69 IN | HEART RATE: 93 BPM | OXYGEN SATURATION: 99 % | TEMPERATURE: 98 F | BODY MASS INDEX: 21.48 KG/M2 | RESPIRATION RATE: 18 BRPM

## 2024-07-30 DIAGNOSIS — R11.2 NAUSEA VOMITING AND DIARRHEA: Primary | ICD-10-CM

## 2024-07-30 DIAGNOSIS — K56.699 SMALL BOWEL STRICTURE: ICD-10-CM

## 2024-07-30 DIAGNOSIS — R10.12 LUQ ABDOMINAL PAIN: ICD-10-CM

## 2024-07-30 DIAGNOSIS — R19.7 NAUSEA VOMITING AND DIARRHEA: Primary | ICD-10-CM

## 2024-07-30 LAB
ALBUMIN SERPL BCP-MCNC: 2.7 G/DL (ref 3.5–5.2)
ALP SERPL-CCNC: 88 U/L (ref 55–135)
ALT SERPL W/O P-5'-P-CCNC: 14 U/L (ref 10–44)
ANION GAP SERPL CALC-SCNC: 12 MMOL/L (ref 8–16)
AST SERPL-CCNC: 16 U/L (ref 10–40)
BASOPHILS # BLD AUTO: 0.06 K/UL (ref 0–0.2)
BASOPHILS NFR BLD: 0.7 % (ref 0–1.9)
BILIRUB SERPL-MCNC: 0.4 MG/DL (ref 0.1–1)
BILIRUB UR QL STRIP: NEGATIVE
BUN SERPL-MCNC: 21 MG/DL (ref 8–23)
CALCIUM SERPL-MCNC: 9.1 MG/DL (ref 8.7–10.5)
CHLORIDE SERPL-SCNC: 103 MMOL/L (ref 95–110)
CLARITY UR: CLEAR
CO2 SERPL-SCNC: 22 MMOL/L (ref 23–29)
COLOR UR: YELLOW
CREAT SERPL-MCNC: 0.9 MG/DL (ref 0.5–1.4)
DIFFERENTIAL METHOD BLD: ABNORMAL
EOSINOPHIL # BLD AUTO: 0 K/UL (ref 0–0.5)
EOSINOPHIL NFR BLD: 0.3 % (ref 0–8)
ERYTHROCYTE [DISTWIDTH] IN BLOOD BY AUTOMATED COUNT: 13.4 % (ref 11.5–14.5)
EST. GFR  (NO RACE VARIABLE): >60 ML/MIN/1.73 M^2
GLUCOSE SERPL-MCNC: 91 MG/DL (ref 70–110)
GLUCOSE UR QL STRIP: NEGATIVE
HCT VFR BLD AUTO: 40 % (ref 37–48.5)
HGB BLD-MCNC: 12.4 G/DL (ref 12–16)
HGB UR QL STRIP: NEGATIVE
IMM GRANULOCYTES # BLD AUTO: 0.03 K/UL (ref 0–0.04)
IMM GRANULOCYTES NFR BLD AUTO: 0.3 % (ref 0–0.5)
KETONES UR QL STRIP: ABNORMAL
LACTATE SERPL-SCNC: 0.8 MMOL/L (ref 0.5–2.2)
LEUKOCYTE ESTERASE UR QL STRIP: NEGATIVE
LIPASE SERPL-CCNC: <3 U/L (ref 4–60)
LYMPHOCYTES # BLD AUTO: 1 K/UL (ref 1–4.8)
LYMPHOCYTES NFR BLD: 11.4 % (ref 18–48)
MCH RBC QN AUTO: 27.3 PG (ref 27–31)
MCHC RBC AUTO-ENTMCNC: 31 G/DL (ref 32–36)
MCV RBC AUTO: 88 FL (ref 82–98)
MONOCYTES # BLD AUTO: 0.7 K/UL (ref 0.3–1)
MONOCYTES NFR BLD: 8.1 % (ref 4–15)
NEUTROPHILS # BLD AUTO: 6.8 K/UL (ref 1.8–7.7)
NEUTROPHILS NFR BLD: 79.2 % (ref 38–73)
NITRITE UR QL STRIP: NEGATIVE
NRBC BLD-RTO: 0 /100 WBC
OHS QRS DURATION: 86 MS
OHS QTC CALCULATION: 461 MS
PH UR STRIP: 6 [PH] (ref 5–8)
PLATELET # BLD AUTO: 647 K/UL (ref 150–450)
PMV BLD AUTO: 8.6 FL (ref 9.2–12.9)
POTASSIUM SERPL-SCNC: 4.3 MMOL/L (ref 3.5–5.1)
PROT SERPL-MCNC: 6.6 G/DL (ref 6–8.4)
PROT UR QL STRIP: ABNORMAL
RBC # BLD AUTO: 4.54 M/UL (ref 4–5.4)
SODIUM SERPL-SCNC: 137 MMOL/L (ref 136–145)
SP GR UR STRIP: >1.03 (ref 1–1.03)
TROPONIN I SERPL DL<=0.01 NG/ML-MCNC: 0.01 NG/ML (ref 0–0.03)
URN SPEC COLLECT METH UR: ABNORMAL
UROBILINOGEN UR STRIP-ACNC: NEGATIVE EU/DL
WBC # BLD AUTO: 8.61 K/UL (ref 3.9–12.7)

## 2024-07-30 PROCEDURE — 85025 COMPLETE CBC W/AUTO DIFF WBC: CPT | Performed by: EMERGENCY MEDICINE

## 2024-07-30 PROCEDURE — 96361 HYDRATE IV INFUSION ADD-ON: CPT

## 2024-07-30 PROCEDURE — 96374 THER/PROPH/DIAG INJ IV PUSH: CPT

## 2024-07-30 PROCEDURE — 99285 EMERGENCY DEPT VISIT HI MDM: CPT | Mod: 25

## 2024-07-30 PROCEDURE — 81003 URINALYSIS AUTO W/O SCOPE: CPT | Performed by: EMERGENCY MEDICINE

## 2024-07-30 PROCEDURE — 25000003 PHARM REV CODE 250: Performed by: EMERGENCY MEDICINE

## 2024-07-30 PROCEDURE — 96375 TX/PRO/DX INJ NEW DRUG ADDON: CPT

## 2024-07-30 PROCEDURE — 25500020 PHARM REV CODE 255: Performed by: EMERGENCY MEDICINE

## 2024-07-30 PROCEDURE — 83605 ASSAY OF LACTIC ACID: CPT | Performed by: EMERGENCY MEDICINE

## 2024-07-30 PROCEDURE — 84484 ASSAY OF TROPONIN QUANT: CPT | Performed by: EMERGENCY MEDICINE

## 2024-07-30 PROCEDURE — 93010 ELECTROCARDIOGRAM REPORT: CPT | Mod: ,,, | Performed by: INTERNAL MEDICINE

## 2024-07-30 PROCEDURE — 83690 ASSAY OF LIPASE: CPT | Performed by: EMERGENCY MEDICINE

## 2024-07-30 PROCEDURE — 93005 ELECTROCARDIOGRAM TRACING: CPT

## 2024-07-30 PROCEDURE — 80053 COMPREHEN METABOLIC PANEL: CPT | Performed by: EMERGENCY MEDICINE

## 2024-07-30 PROCEDURE — 63600175 PHARM REV CODE 636 W HCPCS: Performed by: EMERGENCY MEDICINE

## 2024-07-30 RX ORDER — ONDANSETRON 4 MG/1
4 TABLET, ORALLY DISINTEGRATING ORAL EVERY 8 HOURS PRN
Qty: 15 TABLET | Refills: 0 | Status: SHIPPED | OUTPATIENT
Start: 2024-07-30 | End: 2024-08-04

## 2024-07-30 RX ORDER — ONDANSETRON HYDROCHLORIDE 2 MG/ML
4 INJECTION, SOLUTION INTRAVENOUS
Status: COMPLETED | OUTPATIENT
Start: 2024-07-30 | End: 2024-07-30

## 2024-07-30 RX ORDER — ERGOCALCIFEROL 1.25 MG/1
50000 CAPSULE ORAL
COMMUNITY
Start: 2024-07-17

## 2024-07-30 RX ORDER — AMLODIPINE BESYLATE 2.5 MG/1
2.5 TABLET ORAL
COMMUNITY
Start: 2024-07-26

## 2024-07-30 RX ORDER — MORPHINE SULFATE 4 MG/ML
4 INJECTION, SOLUTION INTRAMUSCULAR; INTRAVENOUS
Status: COMPLETED | OUTPATIENT
Start: 2024-07-30 | End: 2024-07-30

## 2024-07-30 RX ADMIN — ONDANSETRON 4 MG: 2 INJECTION INTRAMUSCULAR; INTRAVENOUS at 10:07

## 2024-07-30 RX ADMIN — MORPHINE SULFATE 4 MG: 4 INJECTION INTRAVENOUS at 10:07

## 2024-07-30 RX ADMIN — IOHEXOL 100 ML: 350 INJECTION, SOLUTION INTRAVENOUS at 11:07

## 2024-07-30 RX ADMIN — SODIUM CHLORIDE 1000 ML: 9 INJECTION, SOLUTION INTRAVENOUS at 10:07

## 2024-07-30 NOTE — ED PROVIDER NOTES
SCRIBE #1 NOTE: IKathia, am scribing for, and in the presence of, Dexter Montez MD. I have scribed the entire note.       History     Chief Complaint   Patient presents with    Abdominal Pain     Pt reports abd pain with n/v x 4 days     Review of patient's allergies indicates:   Allergen Reactions    Naproxen Rash         History of Present Illness     HPI    7/30/2024, 9:39 AM  History obtained from the patient      History of Present Illness: Claudia Chris is a 64 y.o. female patient with a PMHx of GERD and bowel obstruction who presents to the Emergency Department for evaluation of abd pain which onset gradually 4 days ago. Pt states she has  experienced vomiting with her pain, but it has not been a daily occurrence. Symptoms are constant and moderate in severity. Pt rates her pain a 10/10. No mitigating or exacerbating factors reported. Associated sxs include nausea, diarrhea, and headache. Patient denies any fever, SOB, CP, blood in stool, and all other sxs at this time. No prior Tx. Pt mentions she had a hysterectomy. No further complaints or concerns at this time.       Arrival mode: Personal vehicle      PCP: No, Primary Doctor        Past Medical History:  Past Medical History:   Diagnosis Date    GERD (gastroesophageal reflux disease)        Past Surgical History:  Past Surgical History:   Procedure Laterality Date    HYSTERECTOMY           Family History:  No family history on file.    Social History:  Social History     Tobacco Use    Smoking status: Never    Smokeless tobacco: Not on file   Substance and Sexual Activity    Alcohol use: Not on file    Drug use: Never    Sexual activity: Not on file        Review of Systems     Review of Systems   Constitutional:  Negative for fever.   HENT:  Negative for sore throat.    Respiratory:  Negative for shortness of breath.    Cardiovascular:  Negative for chest pain.   Gastrointestinal:  Positive for diarrhea, nausea and vomiting. Negative  "for blood in stool.   Genitourinary:  Negative for dysuria.   Musculoskeletal:  Negative for back pain.   Skin:  Negative for rash.   Neurological:  Positive for headaches. Negative for weakness.   Hematological:  Does not bruise/bleed easily.   All other systems reviewed and are negative.       Physical Exam     Initial Vitals [07/30/24 0924]   BP Pulse Resp Temp SpO2   139/77 (!) 124 20 98.7 °F (37.1 °C) 99 %      MAP       --          Physical Exam  Nursing Notes and Vital Signs Reviewed.  Constitutional: Patient is in no acute distress. Well-developed and well-nourished.  Head: Atraumatic. Normocephalic.  Eyes: PERRL. EOM intact. Conjunctivae are not pale. No scleral icterus.  ENT: Mucous membranes are moist. Oropharynx is clear and symmetric.    Neck: Supple. Full ROM. No lymphadenopathy.  Cardiovascular: Regular rate. Regular rhythm. No murmurs, rubs, or gallops. Distal pulses are 2+ and symmetric.  Pulmonary/Chest: No respiratory distress. Clear to auscultation bilaterally. No wheezing or rales.  Abdominal: Soft and non-distended. No rebound, guarding, or rigidity. LUQ tenderness. High-pitch bowel sounds.  Genitourinary: No CVA tenderness  Musculoskeletal: Moves all extremities. No obvious deformities. No edema. No calf tenderness.  Skin: Warm and dry.  Neurological:  Alert, awake, and appropriate.  Normal speech.  No acute focal neurological deficits are appreciated.  Psychiatric: Normal affect. Good eye contact. Appropriate in content.     ED Course   Procedures  ED Vital Signs:  Vitals:    07/30/24 0924 07/30/24 0935 07/30/24 1006 07/30/24 1103   BP: 139/77   (!) 169/74   Pulse: (!) 124 (!) 112 108 95   Resp: 20  16 18   Temp: 98.7 °F (37.1 °C)   98.2 °F (36.8 °C)   TempSrc: Oral   Oral   SpO2: 99%  100%    Weight: 65.8 kg (145 lb)      Height: 5' 9" (1.753 m)       07/30/24 1217   BP: (!) 151/68   Pulse: 93   Resp:    Temp:    TempSrc:    SpO2: 99%   Weight:    Height:        Abnormal Lab Results:  Labs " Reviewed   CBC W/ AUTO DIFFERENTIAL - Abnormal       Result Value    WBC 8.61      RBC 4.54      Hemoglobin 12.4      Hematocrit 40.0      MCV 88      MCH 27.3      MCHC 31.0 (*)     RDW 13.4      Platelets 647 (*)     MPV 8.6 (*)     Immature Granulocytes 0.3      Gran # (ANC) 6.8      Immature Grans (Abs) 0.03      Lymph # 1.0      Mono # 0.7      Eos # 0.0      Baso # 0.06      nRBC 0      Gran % 79.2 (*)     Lymph % 11.4 (*)     Mono % 8.1      Eosinophil % 0.3      Basophil % 0.7      Differential Method Automated     COMPREHENSIVE METABOLIC PANEL - Abnormal    Sodium 137      Potassium 4.3      Chloride 103      CO2 22 (*)     Glucose 91      BUN 21      Creatinine 0.9      Calcium 9.1      Total Protein 6.6      Albumin 2.7 (*)     Total Bilirubin 0.4      Alkaline Phosphatase 88      AST 16      ALT 14      eGFR >60      Anion Gap 12     LIPASE - Abnormal    Lipase <3 (*)    URINALYSIS, REFLEX TO URINE CULTURE - Abnormal    Specimen UA Urine, Clean Catch      Color, UA Yellow      Appearance, UA Clear      pH, UA 6.0      Specific Gravity, UA >1.030 (*)     Protein, UA Trace (*)     Glucose, UA Negative      Ketones, UA 3+ (*)     Bilirubin (UA) Negative      Occult Blood UA Negative      Nitrite, UA Negative      Urobilinogen, UA Negative      Leukocytes, UA Negative      Narrative:     Specimen Source->Urine   LACTIC ACID, PLASMA    Lactate (Lactic Acid) 0.8     TROPONIN I    Troponin I 0.013          All Lab Results:  Results for orders placed or performed during the hospital encounter of 07/30/24   CBC auto differential   Result Value Ref Range    WBC 8.61 3.90 - 12.70 K/uL    RBC 4.54 4.00 - 5.40 M/uL    Hemoglobin 12.4 12.0 - 16.0 g/dL    Hematocrit 40.0 37.0 - 48.5 %    MCV 88 82 - 98 fL    MCH 27.3 27.0 - 31.0 pg    MCHC 31.0 (L) 32.0 - 36.0 g/dL    RDW 13.4 11.5 - 14.5 %    Platelets 647 (H) 150 - 450 K/uL    MPV 8.6 (L) 9.2 - 12.9 fL    Immature Granulocytes 0.3 0.0 - 0.5 %    Gran # (ANC) 6.8 1.8  - 7.7 K/uL    Immature Grans (Abs) 0.03 0.00 - 0.04 K/uL    Lymph # 1.0 1.0 - 4.8 K/uL    Mono # 0.7 0.3 - 1.0 K/uL    Eos # 0.0 0.0 - 0.5 K/uL    Baso # 0.06 0.00 - 0.20 K/uL    nRBC 0 0 /100 WBC    Gran % 79.2 (H) 38.0 - 73.0 %    Lymph % 11.4 (L) 18.0 - 48.0 %    Mono % 8.1 4.0 - 15.0 %    Eosinophil % 0.3 0.0 - 8.0 %    Basophil % 0.7 0.0 - 1.9 %    Differential Method Automated    Comprehensive metabolic panel   Result Value Ref Range    Sodium 137 136 - 145 mmol/L    Potassium 4.3 3.5 - 5.1 mmol/L    Chloride 103 95 - 110 mmol/L    CO2 22 (L) 23 - 29 mmol/L    Glucose 91 70 - 110 mg/dL    BUN 21 8 - 23 mg/dL    Creatinine 0.9 0.5 - 1.4 mg/dL    Calcium 9.1 8.7 - 10.5 mg/dL    Total Protein 6.6 6.0 - 8.4 g/dL    Albumin 2.7 (L) 3.5 - 5.2 g/dL    Total Bilirubin 0.4 0.1 - 1.0 mg/dL    Alkaline Phosphatase 88 55 - 135 U/L    AST 16 10 - 40 U/L    ALT 14 10 - 44 U/L    eGFR >60 >60 mL/min/1.73 m^2    Anion Gap 12 8 - 16 mmol/L   Lipase   Result Value Ref Range    Lipase <3 (L) 4 - 60 U/L   Urinalysis, Reflex to Urine Culture Urine, Clean Catch    Specimen: Urine, Clean Catch   Result Value Ref Range    Specimen UA Urine, Clean Catch     Color, UA Yellow Yellow, Straw, Mariela    Appearance, UA Clear Clear    pH, UA 6.0 5.0 - 8.0    Specific Gravity, UA >1.030 (A) 1.005 - 1.030    Protein, UA Trace (A) Negative    Glucose, UA Negative Negative    Ketones, UA 3+ (A) Negative    Bilirubin (UA) Negative Negative    Occult Blood UA Negative Negative    Nitrite, UA Negative Negative    Urobilinogen, UA Negative <2.0 EU/dL    Leukocytes, UA Negative Negative   Lactic acid, plasma   Result Value Ref Range    Lactate (Lactic Acid) 0.8 0.5 - 2.2 mmol/L   Troponin I   Result Value Ref Range    Troponin I 0.013 0.000 - 0.026 ng/mL   EKG 12-lead   Result Value Ref Range    QRS Duration 86 ms    [facility] QTC Calculation 461 ms         Imaging Results:  Imaging Results              CT Abdomen Pelvis With IV Contrast NO Oral Contrast  (Final result)  Result time 07/30/24 12:01:53      Final result by Easton Maya MD (Timothy) (07/30/24 12:01:53)                   Impression:      Multiple distended loops of small bowel.  There are 2 areas of focal narrowing involving the small bowel with wall thickening and mucosal enhancement suggesting strictures.  No free air or free fluid.    There appears to be a PillCam within a distended loop of small bowel.    All CT scans at this facility use dose modulation, iterative reconstructions, and/or weight base dosing when appropriate to reduce radiation dose to as low as reasonably achievable      Electronically signed by: Easton Maya MD  Date:    07/30/2024  Time:    12:01               Narrative:    EXAMINATION:  CT ABDOMEN PELVIS WITH IV CONTRAST    CLINICAL HISTORY:  Bowel obstruction suspected;    TECHNIQUE:  Postcontrast images were obtained.    COMPARISON:  CT abdomen pelvis, 05/11/2024.    FINDINGS:  Lung bases are clear    The liver, the spleen, and the pancreas appear normal.    The gallbladder is unremarkable.  There is no bile duct dilatation.    The kidneys are normal.    The aorta and inferior vena cava are unremarkable.    Markedly distended small bowel loops as well as distention involving the stomach.  Small bowel loops measure up to 6 cm.  No free air.  No evidence of pneumatosis.  Once again there is a transitional zone noted in the mid lower abdomen and pelvis where there is narrowing of the small bowel associated with mucosal enhancement and wall thickening 2nd area of focal narrowing involving the small bowel with wall thickening and mucosal enhance suggesting a 2nd structure, best seen on coronal images 36. Suggesting a stricture.  Distal small bowel is collapsed.  No evidence of appendicitis.  No evidence of diverticulitis.  Mild stable mesenteric lymph node enlargement.    There appears to be of PillCam within and distended loop of small bowel at the level of the pelvis.    No  significant ascites.    Bladder is normal. No abnormal masses or fluid collections in the pelvis.    Skeletal structures are intact.  No acute skeletal findings.                                       The EKG was ordered, reviewed, and independently interpreted by the ED provider.  Interpretation time: 9:48  Rate: 107 BPM  Rhythm: sinus tachycardia  Interpretation: Otherwise normal ECG. No STEMI.             The Emergency Provider reviewed the vital signs and test results, which are outlined above.     ED Discussion       1:00 PM: Reassessed pt at this time.  Discussed with pt all pertinent ED information and results. Discussed pt dx and plan of tx. Gave pt all f/u and return to the ED instructions. All questions and concerns were addressed at this time. Pt expresses understanding of information and instructions, and is comfortable with plan to discharge. Pt is stable for discharge.    I discussed with patient and/or family/caretaker that evaluation in the ED does not suggest any emergent or life threatening medical conditions requiring immediate intervention beyond what was provided in the ED, and I believe patient is safe for discharge.  Regardless, an unremarkable evaluation in the ED does not preclude the development or presence of a serious of life threatening condition. As such, patient was instructed to return immediately for any worsening or change in current symptoms.      ED Course as of 07/30/24 1524   Tue Jul 30, 2024   0958 DDx includes SBO, Gastroenteritis, PUD, Gastritis, Dehydration, ACS, UTI, Volvulus [BA]   1340 Repeat abdominal exam is benign.  [BA]      ED Course User Index  [BA] Dexter Montez MD     Medical Decision Making  Amount and/or Complexity of Data Reviewed  Labs: ordered. Decision-making details documented in ED Course.  Radiology: ordered. Decision-making details documented in ED Course.  ECG/medicine tests: ordered and independent interpretation performed. Decision-making details  documented in ED Course.    Risk  Prescription drug management.                ED Medication(s):  Medications   sodium chloride 0.9% bolus 1,000 mL 1,000 mL (0 mLs Intravenous Stopped 7/30/24 1120)   morphine injection 4 mg (4 mg Intravenous Given 7/30/24 1006)   ondansetron injection 4 mg (4 mg Intravenous Given 7/30/24 1004)   iohexoL (OMNIPAQUE 350) injection 100 mL (100 mLs Intravenous Given 7/30/24 1135)       Discharge Medication List as of 7/30/2024 12:54 PM        START taking these medications    Details   ondansetron (ZOFRAN-ODT) 4 MG TbDL Take 1 tablet (4 mg total) by mouth every 8 (eight) hours as needed., Starting Tue 7/30/2024, Until Sun 8/4/2024 at 2359, Normal              Follow-up Information       Your gastroenterologist. Schedule an appointment as soon as possible for a visit in 2 days.    Why: For re-evaluation and further treatment             [facility] - Emergency Dept.. Go today.    Specialty: Emergency Medicine  Why: If symptoms worsen, For re-evaluation and further treatment, As needed  Contact information:  [address] [place] [id number] [phone]             Your Primary Care Provider. Schedule an appointment as soon as possible for a visit in 2 days.    Why: For re-evaluation and further treatment                               Scribe Attestation:   Scribe #1: I performed the above scribed service and the documentation accurately describes the services I performed. I attest to the accuracy of the note.     Attending:   Physician Attestation Statement for Scribe #1: I, Dexter Montez MD, personally performed the services described in this documentation, as scribed by Kathia Bolanos, in my presence, and it is both accurate and complete.           Clinical Impression       ICD-10-CM ICD-9-CM   1. Nausea vomiting and diarrhea  R11.2 787.91    R19.7 787.01   2. LUQ abdominal pain  R10.12 789.02   3. Small bowel stricture  K56.699 560.9       Disposition:    Disposition: Discharged  Condition: Stable         Dexter Montez MD  07/30/24 2451

## 2024-07-30 NOTE — H&P (VIEW-ONLY)
Subjective   Patient ID: Claudia Chris is a 64 y.o. female.    Abdominal Pain      Patient was referred to the clinic for abdominal pain, nausea, vomiting. She was seen at [facility] ER this morning. Labs unremarkable. CT scan shows Multiple distended loops of small bowel.  There are 2 areas of focal narrowing involving the small bowel with wall thickening and mucosal enhancement suggesting strictures.  No free air or free fluid.  There appears to be a PillCam within a distended loop of small bowel.   She reports a history of several SBOs starting in 2017 and the last was in May 2024. She states that they have never had to perform surgery, and have cleared up with NGT placement. She states that she swallowed a Pillcam back in 2017 when they did a colonoscopy at the same time (in [place]).    Her symptoms this morning was severe vomiting and generalized abdominal pain. She states the vomiting is new. Her abdominal pain has been going on x years on and off. Fasting typically helps her symptoms.     Bowels are moving good (daily), but sometimes they look very thin. No bloody stools/melena. She states that over the last 3 years she has lost >100 pounds. Has a lot of belching as well.     Review of Systems   Gastrointestinal:  Positive for abdominal pain.   All other systems reviewed and are negative.        Objective  Physical Exam  Vitals reviewed.   Constitutional:       General: She is not in acute distress.  HENT:      Head: Normocephalic and atraumatic.   Cardiovascular:      Rate and Rhythm: Normal rate.   Pulmonary:      Effort: Pulmonary effort is normal.   Abdominal:      General: There is no distension.      Palpations: Abdomen is soft.      Tenderness: There is abdominal tenderness (generalized).   Musculoskeletal:      Cervical back: Neck supple.      Left lower leg: No edema.   Skin:     Coloration: Skin is not jaundiced.   Neurological:      Mental Status: She is alert and oriented to person, place, and time.  "    Visit Vitals  /88 (BP Location: Left arm, Patient Position: Sitting)   Pulse (!) 108   Temp 98.7 °F (37.1 °C) (Oral)   Resp 20   Ht 175.3 cm (69")   Wt 73.9 kg (163 lb)   SpO2 97%   BMI 24.07 kg/m²   Smoking Status Never   BSA 1.9 m²         Assessment/Plan  Encounter Diagnoses   Name Primary?    Small bowel stricture (HCC) Yes    History of small bowel obstruction      Patient with long history of multiple small bowel obstructions. CT scan this morning in the ER showed 2 small bowel strictures and possible pillcam (that was done in 2017) stuck in the small bowel. Will referral to advance endoscopy to perform small bowel dilation. I also discussed with the patient the high possibility of surgical resection of part of the small bowel.             "

## 2024-07-31 ENCOUNTER — TELEPHONE (OUTPATIENT)
Dept: GASTROENTEROLOGY | Facility: CLINIC | Age: 64
End: 2024-07-31
Payer: MEDICAID

## 2024-07-31 DIAGNOSIS — T18.3XXA FOREIGN BODY IN SMALL INTESTINE, INITIAL ENCOUNTER: ICD-10-CM

## 2024-07-31 DIAGNOSIS — K56.609 SMALL BOWEL OBSTRUCTION: Primary | ICD-10-CM

## 2024-08-05 ENCOUNTER — TELEPHONE (OUTPATIENT)
Dept: ENDOSCOPY | Facility: HOSPITAL | Age: 64
End: 2024-08-05
Payer: MEDICAID

## 2024-08-07 ENCOUNTER — TELEPHONE (OUTPATIENT)
Dept: ENDOSCOPY | Facility: HOSPITAL | Age: 64
End: 2024-08-07
Payer: MEDICAID

## 2024-08-23 ENCOUNTER — TELEPHONE (OUTPATIENT)
Dept: ENDOSCOPY | Facility: HOSPITAL | Age: 64
End: 2024-08-23
Payer: COMMERCIAL

## 2024-08-23 NOTE — TELEPHONE ENCOUNTER
Contacted Pt for Endoscopy precall to confirm scheduled procedure(s) Antegrade Double Balloon on 8/26/24. Pt did not answer. No voicemail available, portal message sent for pt to call Endoscopy Scheduling to confirm.

## 2024-08-26 ENCOUNTER — ANESTHESIA EVENT (OUTPATIENT)
Dept: ENDOSCOPY | Facility: HOSPITAL | Age: 64
End: 2024-08-26
Payer: COMMERCIAL

## 2024-08-26 ENCOUNTER — ANESTHESIA (OUTPATIENT)
Dept: ENDOSCOPY | Facility: HOSPITAL | Age: 64
End: 2024-08-26
Payer: COMMERCIAL

## 2024-08-26 ENCOUNTER — HOSPITAL ENCOUNTER (OUTPATIENT)
Facility: HOSPITAL | Age: 64
Discharge: HOME | End: 2024-08-26
Attending: INTERNAL MEDICINE | Admitting: INTERNAL MEDICINE
Payer: COMMERCIAL

## 2024-08-26 VITALS
BODY MASS INDEX: 24.59 KG/M2 | WEIGHT: 166 LBS | HEART RATE: 78 BPM | DIASTOLIC BLOOD PRESSURE: 65 MMHG | HEIGHT: 69 IN | SYSTOLIC BLOOD PRESSURE: 139 MMHG | RESPIRATION RATE: 18 BRPM | OXYGEN SATURATION: 95 % | TEMPERATURE: 98 F

## 2024-08-26 DIAGNOSIS — T18.9XXA FOREIGN BODY IN DIGESTIVE TRACT, INITIAL ENCOUNTER: ICD-10-CM

## 2024-08-26 DIAGNOSIS — R93.3 ABNORMAL CT SCAN, SMALL BOWEL: ICD-10-CM

## 2024-08-26 DIAGNOSIS — K56.609 SMALL BOWEL OBSTRUCTION: Primary | ICD-10-CM

## 2024-08-26 PROCEDURE — 88341 IMHCHEM/IMCYTCHM EA ADD ANTB: CPT | Mod: 59 | Performed by: PATHOLOGY

## 2024-08-26 PROCEDURE — 44361 SMALL BOWEL ENDOSCOPY/BIOPSY: CPT | Performed by: INTERNAL MEDICINE

## 2024-08-26 PROCEDURE — 88305 TISSUE EXAM BY PATHOLOGIST: CPT | Performed by: PATHOLOGY

## 2024-08-26 PROCEDURE — 25000003 PHARM REV CODE 250: Performed by: STUDENT IN AN ORGANIZED HEALTH CARE EDUCATION/TRAINING PROGRAM

## 2024-08-26 PROCEDURE — 25000003 PHARM REV CODE 250: Performed by: INTERNAL MEDICINE

## 2024-08-26 PROCEDURE — 27201030 HC OVERTUBE: Performed by: INTERNAL MEDICINE

## 2024-08-26 PROCEDURE — 27201012 HC FORCEPS, HOT/COLD, DISP: Performed by: INTERNAL MEDICINE

## 2024-08-26 PROCEDURE — 44363 SMALL BOWEL ENDOSCOPY: CPT | Mod: ,,, | Performed by: INTERNAL MEDICINE

## 2024-08-26 PROCEDURE — 44361 SMALL BOWEL ENDOSCOPY/BIOPSY: CPT | Mod: ,,, | Performed by: INTERNAL MEDICINE

## 2024-08-26 PROCEDURE — 63600175 PHARM REV CODE 636 W HCPCS: Performed by: STUDENT IN AN ORGANIZED HEALTH CARE EDUCATION/TRAINING PROGRAM

## 2024-08-26 PROCEDURE — 44363 SMALL BOWEL ENDOSCOPY: CPT | Performed by: INTERNAL MEDICINE

## 2024-08-26 PROCEDURE — 37000009 HC ANESTHESIA EA ADD 15 MINS: Performed by: INTERNAL MEDICINE

## 2024-08-26 PROCEDURE — 37000008 HC ANESTHESIA 1ST 15 MINUTES: Performed by: INTERNAL MEDICINE

## 2024-08-26 PROCEDURE — 27201042 HC RETRIEVAL NET: Performed by: INTERNAL MEDICINE

## 2024-08-26 PROCEDURE — 88342 IMHCHEM/IMCYTCHM 1ST ANTB: CPT | Mod: 59 | Performed by: PATHOLOGY

## 2024-08-26 RX ORDER — DICYCLOMINE HYDROCHLORIDE 10 MG/1
10 CAPSULE ORAL
COMMUNITY

## 2024-08-26 RX ORDER — GLUCAGON 1 MG
1 KIT INJECTION
Status: DISCONTINUED | OUTPATIENT
Start: 2024-08-26 | End: 2024-08-26 | Stop reason: HOSPADM

## 2024-08-26 RX ORDER — MIDAZOLAM HYDROCHLORIDE 1 MG/ML
INJECTION, SOLUTION INTRAMUSCULAR; INTRAVENOUS
Status: DISCONTINUED | OUTPATIENT
Start: 2024-08-26 | End: 2024-08-26

## 2024-08-26 RX ORDER — PHENYLEPHRINE HCL IN 0.9% NACL 1 MG/10 ML
SYRINGE (ML) INTRAVENOUS
Status: DISCONTINUED | OUTPATIENT
Start: 2024-08-26 | End: 2024-08-26

## 2024-08-26 RX ORDER — SODIUM CHLORIDE 9 MG/ML
INJECTION, SOLUTION INTRAVENOUS CONTINUOUS
Status: DISCONTINUED | OUTPATIENT
Start: 2024-08-26 | End: 2024-08-26 | Stop reason: HOSPADM

## 2024-08-26 RX ORDER — ONDANSETRON 4 MG/1
8 TABLET, FILM COATED ORAL 2 TIMES DAILY
COMMUNITY

## 2024-08-26 RX ORDER — SODIUM CHLORIDE 0.9 % (FLUSH) 0.9 %
3 SYRINGE (ML) INJECTION
Status: DISCONTINUED | OUTPATIENT
Start: 2024-08-26 | End: 2024-08-26 | Stop reason: HOSPADM

## 2024-08-26 RX ORDER — SUCCINYLCHOLINE CHLORIDE 20 MG/ML
INJECTION INTRAMUSCULAR; INTRAVENOUS
Status: DISCONTINUED | OUTPATIENT
Start: 2024-08-26 | End: 2024-08-26

## 2024-08-26 RX ORDER — ONDANSETRON HYDROCHLORIDE 2 MG/ML
INJECTION, SOLUTION INTRAVENOUS
Status: DISCONTINUED | OUTPATIENT
Start: 2024-08-26 | End: 2024-08-26

## 2024-08-26 RX ORDER — ROCURONIUM BROMIDE 10 MG/ML
INJECTION, SOLUTION INTRAVENOUS
Status: DISCONTINUED | OUTPATIENT
Start: 2024-08-26 | End: 2024-08-26

## 2024-08-26 RX ORDER — PROPOFOL 10 MG/ML
VIAL (ML) INTRAVENOUS
Status: DISCONTINUED | OUTPATIENT
Start: 2024-08-26 | End: 2024-08-26

## 2024-08-26 RX ORDER — ONDANSETRON HYDROCHLORIDE 2 MG/ML
4 INJECTION, SOLUTION INTRAVENOUS ONCE AS NEEDED
Status: DISCONTINUED | OUTPATIENT
Start: 2024-08-26 | End: 2024-08-26 | Stop reason: HOSPADM

## 2024-08-26 RX ORDER — FENTANYL CITRATE 50 UG/ML
INJECTION, SOLUTION INTRAMUSCULAR; INTRAVENOUS
Status: DISCONTINUED | OUTPATIENT
Start: 2024-08-26 | End: 2024-08-26

## 2024-08-26 RX ORDER — HALOPERIDOL 5 MG/ML
0.5 INJECTION INTRAMUSCULAR EVERY 10 MIN PRN
Status: DISCONTINUED | OUTPATIENT
Start: 2024-08-26 | End: 2024-08-26 | Stop reason: HOSPADM

## 2024-08-26 RX ORDER — HYDROMORPHONE HYDROCHLORIDE 1 MG/ML
0.2 INJECTION, SOLUTION INTRAMUSCULAR; INTRAVENOUS; SUBCUTANEOUS EVERY 5 MIN PRN
Status: DISCONTINUED | OUTPATIENT
Start: 2024-08-26 | End: 2024-08-26 | Stop reason: HOSPADM

## 2024-08-26 RX ORDER — PANTOPRAZOLE SODIUM 40 MG/1
40 TABLET, DELAYED RELEASE ORAL DAILY
Qty: 90 TABLET | Refills: 3 | Status: SHIPPED | OUTPATIENT
Start: 2024-08-26 | End: 2025-08-26

## 2024-08-26 RX ORDER — LIDOCAINE HYDROCHLORIDE 20 MG/ML
INJECTION, SOLUTION EPIDURAL; INFILTRATION; INTRACAUDAL; PERINEURAL
Status: DISCONTINUED | OUTPATIENT
Start: 2024-08-26 | End: 2024-08-26

## 2024-08-26 RX ORDER — DEXAMETHASONE SODIUM PHOSPHATE 4 MG/ML
INJECTION, SOLUTION INTRA-ARTICULAR; INTRALESIONAL; INTRAMUSCULAR; INTRAVENOUS; SOFT TISSUE
Status: DISCONTINUED | OUTPATIENT
Start: 2024-08-26 | End: 2024-08-26

## 2024-08-26 RX ORDER — DEXMEDETOMIDINE HYDROCHLORIDE 100 UG/ML
INJECTION, SOLUTION INTRAVENOUS
Status: DISCONTINUED | OUTPATIENT
Start: 2024-08-26 | End: 2024-08-26

## 2024-08-26 RX ADMIN — SODIUM CHLORIDE: 9 INJECTION, SOLUTION INTRAVENOUS at 08:08

## 2024-08-26 RX ADMIN — MIDAZOLAM HYDROCHLORIDE 2 MG: 2 INJECTION, SOLUTION INTRAMUSCULAR; INTRAVENOUS at 07:08

## 2024-08-26 RX ADMIN — ONDANSETRON 4 MG: 2 INJECTION INTRAMUSCULAR; INTRAVENOUS at 08:08

## 2024-08-26 RX ADMIN — LIDOCAINE HYDROCHLORIDE 60 MG: 20 INJECTION, SOLUTION EPIDURAL; INFILTRATION; INTRACAUDAL; PERINEURAL at 07:08

## 2024-08-26 RX ADMIN — DEXMEDETOMIDINE 8 MCG: 100 INJECTION, SOLUTION, CONCENTRATE INTRAVENOUS at 08:08

## 2024-08-26 RX ADMIN — Medication 100 MCG: at 07:08

## 2024-08-26 RX ADMIN — SUCCINYLCHOLINE CHLORIDE 80 MG: 20 INJECTION, SOLUTION INTRAMUSCULAR; INTRAVENOUS at 07:08

## 2024-08-26 RX ADMIN — SUGAMMADEX 200 MG: 100 INJECTION, SOLUTION INTRAVENOUS at 08:08

## 2024-08-26 RX ADMIN — SODIUM CHLORIDE: 9 INJECTION, SOLUTION INTRAVENOUS at 07:08

## 2024-08-26 RX ADMIN — Medication 100 MCG: at 08:08

## 2024-08-26 RX ADMIN — DEXAMETHASONE SODIUM PHOSPHATE 4 MG: 4 INJECTION, SOLUTION INTRAMUSCULAR; INTRAVENOUS at 08:08

## 2024-08-26 RX ADMIN — PROPOFOL 160 MG: 10 INJECTION, EMULSION INTRAVENOUS at 07:08

## 2024-08-26 RX ADMIN — FENTANYL CITRATE 100 MCG: 50 INJECTION, SOLUTION INTRAMUSCULAR; INTRAVENOUS at 07:08

## 2024-08-26 RX ADMIN — ROCURONIUM BROMIDE 10 MG: 10 INJECTION INTRAVENOUS at 07:08

## 2024-08-26 RX ADMIN — ROCURONIUM BROMIDE 20 MG: 10 INJECTION INTRAVENOUS at 07:08

## 2024-08-26 NOTE — [employer]OVATION PATIENT INSTRUCTIONS
Discharge Summary/Instructions after an Endoscopic Procedure  Patient Name: Claudia Chris  Patient MRN: [id number]  Patient YOB: 1960  Monday, August 26, 2024  Piyush Landon MD  Dear patient,  As a result of recent federal legislation (The Federal Cures Act), you may   receive lab or pathology results from your procedure in your [facility]   account before your physician is able to contact you. Your physician or   their representative will relay the results to you with their   recommendations at their soonest availability.  Thank you,  RESTRICTIONS:  During your procedure today, you received medications for sedation.  These   medications may affect your judgment, balance and coordination.  Therefore,   for 24 hours, you have the following restrictions:   - DO NOT drive a car, operate machinery, make legal/financial decisions,   sign important papers or drink alcohol.    ACTIVITY:  Today: no heavy lifting, straining or running due to procedural   sedation/anesthesia.  The following day: return to full activity including work.  DIET:  Eat and drink normally unless instructed otherwise.     TREATMENT FOR COMMON SIDE EFFECTS:  - Mild abdominal pain, nausea, belching, bloating or excessive gas:  rest,   eat lightly and use a heating pad.  - Sore Throat: treat with throat lozenges and/or gargle with warm salt   water.  - Because air was used during the procedure, expelling large amounts of air   from your rectum or belching is normal.  - If a bowel prep was taken, you may not have a bowel movement for 1-3 days.    This is normal.  SYMPTOMS TO WATCH FOR AND REPORT TO YOUR PHYSICIAN:  1. Abdominal pain or bloating, other than gas cramps.  2. Chest pain.  3. Back pain.  4. Signs of infection such as: chills or fever occurring within 24 hours   after the procedure.  5. Rectal bleeding, which would show as bright red, maroon, or black stools.   (A tablespoon of blood from the rectum is not serious, especially if    hemorrhoids are present.)  6. Vomiting.  7. Weakness or dizziness.  GO DIRECTLY TO THE NEAREST EMERGENCY ROOM IF YOU HAVE ANY OF THE FOLLOWING:      Difficulty breathing              Chills and/or fever over 101 F   Persistent vomiting and/or vomiting blood   Severe abdominal pain   Severe chest pain   Black, tarry stools   Bleeding- more than one tablespoon   Any other symptom or condition that you feel may need urgent attention  Your doctor recommends these additional instructions:  If any biopsies were taken, your doctors clinic will contact you in 1 to 2   weeks with any results.  - Discharge patient to home.   - Patient has a contact number available for emergencies.  The signs and   symptoms of potential delayed complications were discussed with the   patient.  Return to normal activities tomorrow.  Written discharge   instructions were provided to the patient.   - Resume previous diet.   - Continue present medications.   - Start Protonix 40 mg PO daily.   - Await pathology results. [facility] processing requested.   For questions, problems or results please call your physician - Piyush Landon MD at Work:  [phone].  [facility], EMERGENCY ROOM PHONE NUMBER: [phone]  IF A COMPLICATION OR EMERGENCY SITUATION ARISES AND YOU ARE UNABLE TO REACH   YOUR PHYSICIAN - GO DIRECTLY TO THE EMERGENCY ROOM.  Piyush Landon MD  8/26/2024 9:13:56 AM  This report has been verified and signed electronically.  Dear patient,  As a result of recent federal legislation (The Federal Cures Act), you may   receive lab or pathology results from your procedure in your [facility]   account before your physician is able to contact you. Your physician or   their representative will relay the results to you with their   recommendations at their soonest availability.  Thank you,  PROVATION

## 2024-08-26 NOTE — ANESTHESIA PREPROCEDURE EVALUATION
08/26/2024  Claudia Chris is a 64 y.o., female.      Pre-op Assessment    I have reviewed the Patient Summary Reports.     I have reviewed the Nursing Notes. I have reviewed the NPO Status.   I have reviewed the Medications.     Review of Systems  Anesthesia Hx:  No problems with previous Anesthesia   History of prior surgery of interest to airway management or planning:  Previous anesthesia: General          Denies Personal Hx of Anesthesia complications.                    Cardiovascular:     Hypertension           hyperlipidemia                             Pulmonary:  Pulmonary Normal                       Renal/:  Renal/ Normal                 Hepatic/GI:     GERD   SBO          Neurological:  Neurology Normal                                      Endocrine:  Endocrine Normal              Patient Active Problem List   Diagnosis    Small bowel obstruction    Essential hypertension    GERD (gastroesophageal reflux disease)    Hyperlipidemia     Past Medical History:   Diagnosis Date    GERD (gastroesophageal reflux disease)      Past Surgical History:   Procedure Laterality Date    HYSTERECTOMY           Physical Exam  General: Well nourished, Cooperative and Alert    Airway:  Mallampati: II   Mouth Opening: Normal  TM Distance: Normal  Tongue: Normal  Neck ROM: Normal ROM    Dental:  Intact    Chest/Lungs:  Normal Respiratory Rate    Heart:  Rate: Normal  Rhythm: Regular Rhythm        Anesthesia Plan  Type of Anesthesia, risks & benefits discussed:    Anesthesia Type: Gen Natural Airway, Gen ETT, MAC  Intra-op Monitoring Plan: Standard ASA Monitors  Post Op Pain Control Plan: multimodal analgesia  Induction:  IV  Airway Plan: Direct, Post-Induction  Informed Consent: Informed consent signed with the Patient and all parties understand the risks and agree with anesthesia plan.  All questions  answered.   ASA Score: 2  Day of Surgery Review of History & Physical: H&P Update referred to the surgeon/provider.    Ready For Surgery From Anesthesia Perspective.     .

## 2024-08-26 NOTE — ANESTHESIA PROCEDURE NOTES
Intubation    Date/Time: 8/26/2024 7:50 AM    Performed by: Josseline Whittington CRNA  Authorized by: Nabor Parikh MD    Intubation:     Induction:  Rapid sequence induction    Intubated:  Postinduction    Mask Ventilation:  Not attempted    Attempts:  1    Attempted By:  CRNA    Method of Intubation:  Video laryngoscopy    Blade:  Verdin 3    Laryngeal View Grade: Grade I - full view of cords      Difficult Airway Encountered?: No      Complications:  None    Airway Device:  Oral endotracheal tube    Airway Device Size:  7.0    Style/Cuff Inflation:  Cuffed    Inflation Amount (mL):  7    Tube secured:  21    Secured at:  The lips    Placement Verified By:  Capnometry    Complicating Factors:  None    Findings Post-Intubation:  BS equal bilateral and atraumatic/condition of teeth unchanged

## 2024-08-26 NOTE — TRANSFER OF CARE
"Anesthesia Transfer of Care Note    Patient: Claudia Chris    Procedure(s) Performed: Procedure(s) (LRB):  ENTEROSCOPY, DOUBLE BALLOON, ANTEGRADE (N/A)    Patient location: PACU    Anesthesia Type: general    Transport from OR: Transported from OR on 6-10 L/min O2 by face mask with adequate spontaneous ventilation    Post pain: adequate analgesia    Post assessment: no apparent anesthetic complications    Post vital signs: stable    Level of consciousness: responds to stimulation    Nausea/Vomiting: no nausea/vomiting    Complications: none    Transfer of care protocol was followed      Last vitals: Visit Vitals  BP (!) 114/62   Pulse 86   Temp 36.7 °C (98.1 °F) (Temporal)   Resp 16   Ht 5' 9" (1.753 m)   Wt 75.3 kg (166 lb)   SpO2 100%   Breastfeeding No   BMI 24.51 kg/m²     "

## 2024-08-26 NOTE — PLAN OF CARE
After speaking with Dr. Landon regarding today's enteroscopy, patient has been discharged to home via wheelchair, escorted by her daughter. Pt alert and talkative, vitals stable on room air, tolerating PO intake. Discharge instructions (written and verbal) and follow-up information given to patient and daughter who verbalized understanding, as well as a readiness for discharge. C contact info provided for additional questions following discharge.
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
## 2024-08-28 LAB
FINAL PATHOLOGIC DIAGNOSIS: NORMAL
GROSS: NORMAL
Lab: NORMAL
MICROSCOPIC EXAM: NORMAL

## 2024-08-29 ENCOUNTER — TELEPHONE (OUTPATIENT)
Dept: GASTROENTEROLOGY | Facility: CLINIC | Age: 64
End: 2024-08-29
Payer: COMMERCIAL

## 2024-08-29 NOTE — TELEPHONE ENCOUNTER
----- Message from Arely Montes sent at 8/29/2024  1:26 PM CDT -----  Regarding: Results  Contact: [phone]  Hi, pt called to request a call to go over her results. Pls call the pt at  [phone].    Thank you.

## 2024-08-30 ENCOUNTER — PATIENT MESSAGE (OUTPATIENT)
Dept: GASTROENTEROLOGY | Facility: CLINIC | Age: 64
End: 2024-08-30
Payer: COMMERCIAL

## 2024-08-30 RX ORDER — PREDNISONE 10 MG/1
TABLET ORAL
Qty: 70 TABLET | Refills: 0 | Status: SHIPPED | OUTPATIENT
Start: 2024-08-30 | End: 2024-09-27

## 2024-08-30 NOTE — ANESTHESIA POSTPROCEDURE EVALUATION
Anesthesia Post Evaluation    Patient: Claudia Chris    Procedure(s) Performed: Procedure(s) (LRB):  ENTEROSCOPY, DOUBLE BALLOON, ANTEGRADE (N/A)    Final Anesthesia Type: general      Patient location during evaluation: PACU  Patient participation: Yes- Able to Participate  Level of consciousness: awake and alert and oriented  Post-procedure vital signs: reviewed and stable  Pain management: adequate  Airway patency: patent    PONV status at discharge: No PONV  Anesthetic complications: no      Cardiovascular status: hemodynamically stable  Respiratory status: unassisted, spontaneous ventilation and room air  Hydration status: euvolemic  Follow-up not needed.              Vitals Value Taken Time   /65 08/26/24 1001   Temp 36.4 °C (97.5 °F) 08/26/24 0912   Pulse 75 08/26/24 1000   Resp 18 08/26/24 1000   SpO2 100 % 08/26/24 1000   Vitals shown include unfiled device data.      No case tracking events are documented in the log.      Pain/Georgette Score: No data recorded

## 2025-03-08 ENCOUNTER — HOSPITAL ENCOUNTER (INPATIENT)
Facility: HOSPITAL | Age: 65
LOS: 2 days | Discharge: HOME | DRG: 387 | End: 2025-03-10
Attending: FAMILY MEDICINE | Admitting: STUDENT IN AN ORGANIZED HEALTH CARE EDUCATION/TRAINING PROGRAM
Payer: MEDICAID

## 2025-03-08 DIAGNOSIS — K56.609 SMALL BOWEL OBSTRUCTION: Primary | ICD-10-CM

## 2025-03-08 DIAGNOSIS — R07.9 CHEST PAIN: ICD-10-CM

## 2025-03-08 DIAGNOSIS — K50.012 CROHN'S DISEASE OF SMALL INTESTINE WITH INTESTINAL OBSTRUCTION: ICD-10-CM

## 2025-03-08 DIAGNOSIS — R10.9 ABDOMINAL PAIN: ICD-10-CM

## 2025-03-08 PROBLEM — K56.699 SMALL BOWEL STRICTURE: Status: ACTIVE | Noted: 2025-03-08

## 2025-03-08 PROBLEM — M17.12 PRIMARY OSTEOARTHRITIS OF LEFT KNEE: Status: ACTIVE | Noted: 2025-03-08

## 2025-03-08 LAB
ALBUMIN SERPL BCP-MCNC: 2.8 G/DL (ref 3.5–5.2)
ALP SERPL-CCNC: 74 U/L (ref 40–150)
ALT SERPL W/O P-5'-P-CCNC: 11 U/L (ref 10–44)
ANION GAP SERPL CALC-SCNC: 13 MMOL/L (ref 8–16)
AST SERPL-CCNC: 14 U/L (ref 10–40)
BACTERIA #/AREA URNS HPF: NORMAL /HPF
BASOPHILS # BLD AUTO: 0.08 K/UL (ref 0–0.2)
BASOPHILS NFR BLD: 1.3 % (ref 0–1.9)
BILIRUB SERPL-MCNC: 0.3 MG/DL (ref 0.1–1)
BILIRUB UR QL STRIP: ABNORMAL
BUN SERPL-MCNC: 13 MG/DL (ref 8–23)
CALCIUM SERPL-MCNC: 9 MG/DL (ref 8.7–10.5)
CHLORIDE SERPL-SCNC: 103 MMOL/L (ref 95–110)
CLARITY UR: ABNORMAL
CO2 SERPL-SCNC: 20 MMOL/L (ref 23–29)
COLOR UR: YELLOW
CREAT SERPL-MCNC: 0.8 MG/DL (ref 0.5–1.4)
DIFFERENTIAL METHOD BLD: ABNORMAL
EOSINOPHIL # BLD AUTO: 0.1 K/UL (ref 0–0.5)
EOSINOPHIL NFR BLD: 1.1 % (ref 0–8)
ERYTHROCYTE [DISTWIDTH] IN BLOOD BY AUTOMATED COUNT: 14.3 % (ref 11.5–14.5)
EST. GFR  (NO RACE VARIABLE): >60 ML/MIN/1.73 M^2
GLUCOSE SERPL-MCNC: 97 MG/DL (ref 70–110)
GLUCOSE UR QL STRIP: NEGATIVE
HCT VFR BLD AUTO: 40.2 % (ref 37–48.5)
HCV AB SERPL QL IA: NEGATIVE
HEP C VIRUS HOLD SPECIMEN: NORMAL
HGB BLD-MCNC: 12.6 G/DL (ref 12–16)
HGB UR QL STRIP: NEGATIVE
HIV 1+2 AB+HIV1 P24 AG SERPL QL IA: NEGATIVE
HYALINE CASTS #/AREA URNS LPF: 0 /LPF
IMM GRANULOCYTES # BLD AUTO: 0.08 K/UL (ref 0–0.04)
IMM GRANULOCYTES NFR BLD AUTO: 1.3 % (ref 0–0.5)
KETONES UR QL STRIP: ABNORMAL
LEUKOCYTE ESTERASE UR QL STRIP: NEGATIVE
LIPASE SERPL-CCNC: 3 U/L (ref 4–60)
LYMPHOCYTES # BLD AUTO: 1.3 K/UL (ref 1–4.8)
LYMPHOCYTES NFR BLD: 21.3 % (ref 18–48)
MAGNESIUM SERPL-MCNC: 1.9 MG/DL (ref 1.6–2.6)
MCH RBC QN AUTO: 26.8 PG (ref 27–31)
MCHC RBC AUTO-ENTMCNC: 31.3 G/DL (ref 32–36)
MCV RBC AUTO: 86 FL (ref 82–98)
MICROSCOPIC COMMENT: NORMAL
MONOCYTES # BLD AUTO: 1.2 K/UL (ref 0.3–1)
MONOCYTES NFR BLD: 19.7 % (ref 4–15)
NEUTROPHILS # BLD AUTO: 3.4 K/UL (ref 1.8–7.7)
NEUTROPHILS NFR BLD: 55.3 % (ref 38–73)
NITRITE UR QL STRIP: NEGATIVE
NRBC BLD-RTO: 0 /100 WBC
PH UR STRIP: 6 [PH] (ref 5–8)
PHOSPHATE SERPL-MCNC: 3 MG/DL (ref 2.7–4.5)
PLATELET # BLD AUTO: 568 K/UL (ref 150–450)
PMV BLD AUTO: 8.2 FL (ref 9.2–12.9)
POTASSIUM SERPL-SCNC: 3.5 MMOL/L (ref 3.5–5.1)
PROT SERPL-MCNC: 6.4 G/DL (ref 6–8.4)
PROT UR QL STRIP: ABNORMAL
RBC # BLD AUTO: 4.7 M/UL (ref 4–5.4)
RBC #/AREA URNS HPF: 3 /HPF (ref 0–4)
SODIUM SERPL-SCNC: 136 MMOL/L (ref 136–145)
SP GR UR STRIP: 1.03 (ref 1–1.03)
SQUAMOUS #/AREA URNS HPF: 15 /HPF
URN SPEC COLLECT METH UR: ABNORMAL
UROBILINOGEN UR STRIP-ACNC: ABNORMAL EU/DL
WBC # BLD AUTO: 6.2 K/UL (ref 3.9–12.7)
WBC #/AREA URNS HPF: 5 /HPF (ref 0–5)

## 2025-03-08 PROCEDURE — 63600175 PHARM REV CODE 636 W HCPCS: Performed by: NURSE PRACTITIONER

## 2025-03-08 PROCEDURE — 25000003 PHARM REV CODE 250: Performed by: FAMILY MEDICINE

## 2025-03-08 PROCEDURE — 96375 TX/PRO/DX INJ NEW DRUG ADDON: CPT

## 2025-03-08 PROCEDURE — 99285 EMERGENCY DEPT VISIT HI MDM: CPT | Mod: 25

## 2025-03-08 PROCEDURE — 81000 URINALYSIS NONAUTO W/SCOPE: CPT | Performed by: NURSE PRACTITIONER

## 2025-03-08 PROCEDURE — 86803 HEPATITIS C AB TEST: CPT | Performed by: EMERGENCY MEDICINE

## 2025-03-08 PROCEDURE — 84100 ASSAY OF PHOSPHORUS: CPT | Performed by: FAMILY MEDICINE

## 2025-03-08 PROCEDURE — 63600175 PHARM REV CODE 636 W HCPCS: Performed by: FAMILY MEDICINE

## 2025-03-08 PROCEDURE — 96374 THER/PROPH/DIAG INJ IV PUSH: CPT

## 2025-03-08 PROCEDURE — 80053 COMPREHEN METABOLIC PANEL: CPT | Performed by: FAMILY MEDICINE

## 2025-03-08 PROCEDURE — 85025 COMPLETE CBC W/AUTO DIFF WBC: CPT | Performed by: NURSE PRACTITIONER

## 2025-03-08 PROCEDURE — 25000003 PHARM REV CODE 250: Performed by: STUDENT IN AN ORGANIZED HEALTH CARE EDUCATION/TRAINING PROGRAM

## 2025-03-08 PROCEDURE — 83690 ASSAY OF LIPASE: CPT | Performed by: FAMILY MEDICINE

## 2025-03-08 PROCEDURE — 87389 HIV-1 AG W/HIV-1&-2 AB AG IA: CPT | Performed by: EMERGENCY MEDICINE

## 2025-03-08 PROCEDURE — 11000001 HC ACUTE MED/SURG PRIVATE ROOM

## 2025-03-08 PROCEDURE — 83735 ASSAY OF MAGNESIUM: CPT | Performed by: FAMILY MEDICINE

## 2025-03-08 RX ORDER — IBUPROFEN 200 MG
24 TABLET ORAL
Status: DISCONTINUED | OUTPATIENT
Start: 2025-03-08 | End: 2025-03-10 | Stop reason: HOSPADM

## 2025-03-08 RX ORDER — ONDANSETRON HYDROCHLORIDE 2 MG/ML
4 INJECTION, SOLUTION INTRAVENOUS EVERY 8 HOURS PRN
Status: DISCONTINUED | OUTPATIENT
Start: 2025-03-08 | End: 2025-03-10 | Stop reason: HOSPADM

## 2025-03-08 RX ORDER — SODIUM CHLORIDE 0.9 % (FLUSH) 0.9 %
10 SYRINGE (ML) INJECTION EVERY 12 HOURS PRN
Status: DISCONTINUED | OUTPATIENT
Start: 2025-03-08 | End: 2025-03-10 | Stop reason: HOSPADM

## 2025-03-08 RX ORDER — TALC
6 POWDER (GRAM) TOPICAL NIGHTLY PRN
Status: DISCONTINUED | OUTPATIENT
Start: 2025-03-08 | End: 2025-03-10 | Stop reason: HOSPADM

## 2025-03-08 RX ORDER — HYDRALAZINE HYDROCHLORIDE 20 MG/ML
5 INJECTION INTRAMUSCULAR; INTRAVENOUS EVERY 6 HOURS PRN
Status: DISCONTINUED | OUTPATIENT
Start: 2025-03-08 | End: 2025-03-10 | Stop reason: HOSPADM

## 2025-03-08 RX ORDER — METHYLPREDNISOLONE SOD SUCC 125 MG
125 VIAL (EA) INJECTION
Status: ACTIVE | OUTPATIENT
Start: 2025-03-08 | End: 2025-03-09

## 2025-03-08 RX ORDER — NALOXONE HCL 0.4 MG/ML
0.02 VIAL (ML) INJECTION
Status: DISCONTINUED | OUTPATIENT
Start: 2025-03-08 | End: 2025-03-10 | Stop reason: HOSPADM

## 2025-03-08 RX ORDER — ONDANSETRON HYDROCHLORIDE 2 MG/ML
4 INJECTION, SOLUTION INTRAVENOUS
Status: COMPLETED | OUTPATIENT
Start: 2025-03-08 | End: 2025-03-08

## 2025-03-08 RX ORDER — GLUCAGON 1 MG
1 KIT INJECTION
Status: DISCONTINUED | OUTPATIENT
Start: 2025-03-08 | End: 2025-03-10 | Stop reason: HOSPADM

## 2025-03-08 RX ORDER — HYDROCODONE BITARTRATE AND ACETAMINOPHEN 10; 325 MG/1; MG/1
1 TABLET ORAL
Refills: 0 | Status: COMPLETED | OUTPATIENT
Start: 2025-03-08 | End: 2025-03-08

## 2025-03-08 RX ORDER — PROCHLORPERAZINE EDISYLATE 5 MG/ML
2.5 INJECTION INTRAMUSCULAR; INTRAVENOUS EVERY 8 HOURS PRN
Status: DISCONTINUED | OUTPATIENT
Start: 2025-03-08 | End: 2025-03-10 | Stop reason: HOSPADM

## 2025-03-08 RX ORDER — ACETAMINOPHEN AND CODEINE PHOSPHATE 300; 60 MG/1; MG/1
1 TABLET ORAL EVERY 8 HOURS PRN
Status: ON HOLD | COMMUNITY
Start: 2025-02-18 | End: 2025-03-10 | Stop reason: HOSPADM

## 2025-03-08 RX ORDER — ACETAMINOPHEN 325 MG/1
650 TABLET ORAL EVERY 6 HOURS PRN
Status: DISCONTINUED | OUTPATIENT
Start: 2025-03-08 | End: 2025-03-10 | Stop reason: HOSPADM

## 2025-03-08 RX ORDER — IBUPROFEN 200 MG
16 TABLET ORAL
Status: DISCONTINUED | OUTPATIENT
Start: 2025-03-08 | End: 2025-03-10 | Stop reason: HOSPADM

## 2025-03-08 RX ORDER — SODIUM CHLORIDE 9 MG/ML
INJECTION, SOLUTION INTRAVENOUS CONTINUOUS
Status: DISCONTINUED | OUTPATIENT
Start: 2025-03-08 | End: 2025-03-09

## 2025-03-08 RX ORDER — FUROSEMIDE 20 MG/1
20 TABLET ORAL DAILY
COMMUNITY
Start: 2025-02-11

## 2025-03-08 RX ADMIN — HYDROCODONE BITARTRATE AND ACETAMINOPHEN 1 TABLET: 10; 325 TABLET ORAL at 05:03

## 2025-03-08 RX ADMIN — ONDANSETRON 4 MG: 2 INJECTION INTRAMUSCULAR; INTRAVENOUS at 03:03

## 2025-03-08 RX ADMIN — SODIUM CHLORIDE: 9 INJECTION, SOLUTION INTRAVENOUS at 08:03

## 2025-03-08 RX ADMIN — PROMETHAZINE HYDROCHLORIDE 25 MG: 25 INJECTION INTRAMUSCULAR; INTRAVENOUS at 05:03

## 2025-03-08 NOTE — ASSESSMENT & PLAN NOTE
Home meds for hypertension were reviewed and noted below.   Home Hypertension Medications per chart review             amLODIPine (NORVASC) 2.5 MG tablet Take 2.5 mg by mouth.    furosemide (LASIX) 20 MG tablet Take 20 mg by mouth once daily.            Patients blood pressure range in the last 24 hours was: BP  Min: 126/78  Max: 154/70.      PLAN:  -While in the hospital, will manage blood pressure as follows; Continue home antihypertensive regimen when able to tolerate PO  -The patient's inpatient anti-hypertensive regimen is listed below:  Current Antihypertensives  , Daily, Oral  hydrALAZINE injection 5 mg, Every 6 hours PRN, Intravenous  -Will utilize p.r.n. blood pressure medication only if patient's blood pressure greater than 180/110 and she develops symptoms such as worsening chest pain or shortness of breath.

## 2025-03-08 NOTE — ED NOTES
Bed: 16  Expected date:   Expected time:   Means of arrival: Personal Transportation  Comments:  When clean

## 2025-03-08 NOTE — FIRST PROVIDER EVALUATION
Medical screening examination initiated.  I have conducted a focused provider triage encounter, findings are as follows:    Brief history of present illness:  pt presents for abdominal pain with n/v/d. Hx chron's     Vitals:    03/08/25 1402   BP: 129/80   BP Location: Right arm   Pulse: 107   Resp: 20   Temp: 98.7 °F (37.1 °C)   TempSrc: Oral   SpO2: 97%   Weight: 85.5 kg (188 lb 6.4 oz)       Pertinent physical exam:  nad    Brief workup plan:  labs, meds, further eval    Preliminary workup initiated; this workup will be continued and followed by the physician or advanced practice provider that is assigned to the patient when roomed.

## 2025-03-08 NOTE — ASSESSMENT & PLAN NOTE
Chronic, follows with outpatient providers with recent imaging in 02/2025    PLAN:  Multimodal pain regimen PRN

## 2025-03-08 NOTE — ASSESSMENT & PLAN NOTE
"Hx of Chron's disease.   -CT imaging 10/09/24 noted: "1. Marked dilatation of multiple proximal small bowel loops compatible with Crohn's disease. There is one focal area of stenosis as described where there is a very prominent area of prestenotic dilatation, and another area of post stenotic dilatation. More proximal to this area, there are some apparent areas of stenosis within the affected small bowel. "  -Evaluated by GI(Dr. Lopez) on 12/04/2024 where evaluation noted concerns for noncompliance with Humira.    -Further evaluated by Colorectal surgery (Dr. Kevin) on 12/11/24 where evaluation noted no indication for surgical intervention at the time based on evaluation of CT imaging.  Follow up MR enterography was completed which noted "Findings concerning for sequelae of acute on chronic Crohn's disease with several segments of active inflammation in the proximal and distal small bowel with associated mild stricturing. "  -Presented to the ED this admission for concerns of worsening abdominal pain with associated nausea/vomiting. Reports recent compliance with Humira every 2 weeks. Last dose 2/27/25.  Per ED staff, CT imaging noted concerns for "multifocal small bowel wall thickening with strictures causing SBO. Most discrete transition point to the paramedian deep lower abdomen. "    PLAN:  - NPO  - Started on IV fluids with NS  - Empiric antibiotics coverage with piperacillin-tazobactam  - Followup ESR/CRP labs  - Pain control with analgesics PRN. Limit opioids to prevent worsening of SBO.  - Antiemetics PRN.   - Replete electrolytes as necessitated  - General Surgery Consulted, follow-up recs  - Gastroenterology consulted, follow-up recs    "
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
## 2025-03-08 NOTE — ASSESSMENT & PLAN NOTE
Not on any home meds currently, was previously on pantoprazole per Rx fill history. Per chart review, prior trial of Famotidine stopped due to concerns for diarrhea.     PLAN:  Initiate  PPI if concerns for GERD exacerbation

## 2025-03-09 LAB
ALBUMIN SERPL BCP-MCNC: 2.6 G/DL (ref 3.5–5.2)
ALP SERPL-CCNC: 65 U/L (ref 40–150)
ALT SERPL W/O P-5'-P-CCNC: 10 U/L (ref 10–44)
ANION GAP SERPL CALC-SCNC: 10 MMOL/L (ref 8–16)
AST SERPL-CCNC: 11 U/L (ref 10–40)
BASOPHILS # BLD AUTO: 0.05 K/UL (ref 0–0.2)
BASOPHILS NFR BLD: 1 % (ref 0–1.9)
BILIRUB SERPL-MCNC: 0.4 MG/DL (ref 0.1–1)
BUN SERPL-MCNC: 13 MG/DL (ref 8–23)
CALCIUM SERPL-MCNC: 8.3 MG/DL (ref 8.7–10.5)
CHLORIDE SERPL-SCNC: 106 MMOL/L (ref 95–110)
CO2 SERPL-SCNC: 22 MMOL/L (ref 23–29)
CREAT SERPL-MCNC: 0.8 MG/DL (ref 0.5–1.4)
CRP SERPL-MCNC: 116.5 MG/L (ref 0–8.2)
DIFFERENTIAL METHOD BLD: ABNORMAL
EOSINOPHIL # BLD AUTO: 0.1 K/UL (ref 0–0.5)
EOSINOPHIL NFR BLD: 2 % (ref 0–8)
ERYTHROCYTE [DISTWIDTH] IN BLOOD BY AUTOMATED COUNT: 14.4 % (ref 11.5–14.5)
ERYTHROCYTE [SEDIMENTATION RATE] IN BLOOD BY WESTERGREN METHOD: 47 MM/HR (ref 0–36)
EST. GFR  (NO RACE VARIABLE): >60 ML/MIN/1.73 M^2
GLUCOSE SERPL-MCNC: 78 MG/DL (ref 70–110)
HCT VFR BLD AUTO: 34.8 % (ref 37–48.5)
HGB BLD-MCNC: 10.4 G/DL (ref 12–16)
IMM GRANULOCYTES # BLD AUTO: 0.07 K/UL (ref 0–0.04)
IMM GRANULOCYTES NFR BLD AUTO: 1.4 % (ref 0–0.5)
LYMPHOCYTES # BLD AUTO: 1.1 K/UL (ref 1–4.8)
LYMPHOCYTES NFR BLD: 22 % (ref 18–48)
MAGNESIUM SERPL-MCNC: 1.7 MG/DL (ref 1.6–2.6)
MCH RBC QN AUTO: 25.9 PG (ref 27–31)
MCHC RBC AUTO-ENTMCNC: 29.9 G/DL (ref 32–36)
MCV RBC AUTO: 87 FL (ref 82–98)
MONOCYTES # BLD AUTO: 1.1 K/UL (ref 0.3–1)
MONOCYTES NFR BLD: 21.2 % (ref 4–15)
NEUTROPHILS # BLD AUTO: 2.6 K/UL (ref 1.8–7.7)
NEUTROPHILS NFR BLD: 52.4 % (ref 38–73)
NRBC BLD-RTO: 0 /100 WBC
PHOSPHATE SERPL-MCNC: 3.2 MG/DL (ref 2.7–4.5)
PLATELET # BLD AUTO: 522 K/UL (ref 150–450)
PMV BLD AUTO: 8.5 FL (ref 9.2–12.9)
POTASSIUM SERPL-SCNC: 3.9 MMOL/L (ref 3.5–5.1)
PROT SERPL-MCNC: 5.2 G/DL (ref 6–8.4)
RBC # BLD AUTO: 4.02 M/UL (ref 4–5.4)
SODIUM SERPL-SCNC: 138 MMOL/L (ref 136–145)
WBC # BLD AUTO: 4.96 K/UL (ref 3.9–12.7)

## 2025-03-09 PROCEDURE — 99223 1ST HOSP IP/OBS HIGH 75: CPT | Mod: ,,, | Performed by: SURGERY

## 2025-03-09 PROCEDURE — 11000001 HC ACUTE MED/SURG PRIVATE ROOM

## 2025-03-09 PROCEDURE — 80053 COMPREHEN METABOLIC PANEL: CPT | Performed by: STUDENT IN AN ORGANIZED HEALTH CARE EDUCATION/TRAINING PROGRAM

## 2025-03-09 PROCEDURE — 83993 ASSAY FOR CALPROTECTIN FECAL: CPT | Performed by: STUDENT IN AN ORGANIZED HEALTH CARE EDUCATION/TRAINING PROGRAM

## 2025-03-09 PROCEDURE — 63600175 PHARM REV CODE 636 W HCPCS: Mod: JZ,TB | Performed by: INTERNAL MEDICINE

## 2025-03-09 PROCEDURE — 36415 COLL VENOUS BLD VENIPUNCTURE: CPT | Performed by: FAMILY MEDICINE

## 2025-03-09 PROCEDURE — 84100 ASSAY OF PHOSPHORUS: CPT | Performed by: STUDENT IN AN ORGANIZED HEALTH CARE EDUCATION/TRAINING PROGRAM

## 2025-03-09 PROCEDURE — 85025 COMPLETE CBC W/AUTO DIFF WBC: CPT | Performed by: STUDENT IN AN ORGANIZED HEALTH CARE EDUCATION/TRAINING PROGRAM

## 2025-03-09 PROCEDURE — 83735 ASSAY OF MAGNESIUM: CPT | Performed by: STUDENT IN AN ORGANIZED HEALTH CARE EDUCATION/TRAINING PROGRAM

## 2025-03-09 PROCEDURE — 86140 C-REACTIVE PROTEIN: CPT | Performed by: FAMILY MEDICINE

## 2025-03-09 PROCEDURE — 99223 1ST HOSP IP/OBS HIGH 75: CPT | Mod: ,,, | Performed by: INTERNAL MEDICINE

## 2025-03-09 PROCEDURE — 25000003 PHARM REV CODE 250: Performed by: STUDENT IN AN ORGANIZED HEALTH CARE EDUCATION/TRAINING PROGRAM

## 2025-03-09 PROCEDURE — 85651 RBC SED RATE NONAUTOMATED: CPT | Performed by: STUDENT IN AN ORGANIZED HEALTH CARE EDUCATION/TRAINING PROGRAM

## 2025-03-09 PROCEDURE — 63600175 PHARM REV CODE 636 W HCPCS: Performed by: STUDENT IN AN ORGANIZED HEALTH CARE EDUCATION/TRAINING PROGRAM

## 2025-03-09 RX ORDER — METHYLPREDNISOLONE SOD SUCC 125 MG
80 VIAL (EA) INJECTION
Status: DISCONTINUED | OUTPATIENT
Start: 2025-03-09 | End: 2025-03-09

## 2025-03-09 RX ORDER — METHYLPREDNISOLONE SOD SUCC 125 MG
60 VIAL (EA) INJECTION DAILY
Status: DISCONTINUED | OUTPATIENT
Start: 2025-03-09 | End: 2025-03-10 | Stop reason: HOSPADM

## 2025-03-09 RX ORDER — AMLODIPINE BESYLATE 2.5 MG/1
2.5 TABLET ORAL DAILY
Status: DISCONTINUED | OUTPATIENT
Start: 2025-03-10 | End: 2025-03-10 | Stop reason: HOSPADM

## 2025-03-09 RX ADMIN — PIPERACILLIN SODIUM AND TAZOBACTAM SODIUM 4.5 G: 4; .5 INJECTION, POWDER, FOR SOLUTION INTRAVENOUS at 01:03

## 2025-03-09 RX ADMIN — METHYLPREDNISOLONE SODIUM SUCCINATE 60 MG: 125 INJECTION, POWDER, FOR SOLUTION INTRAMUSCULAR; INTRAVENOUS at 09:03

## 2025-03-09 RX ADMIN — PIPERACILLIN SODIUM AND TAZOBACTAM SODIUM 4.5 G: 4; .5 INJECTION, POWDER, FOR SOLUTION INTRAVENOUS at 09:03

## 2025-03-09 RX ADMIN — SODIUM CHLORIDE: 9 INJECTION, SOLUTION INTRAVENOUS at 11:03

## 2025-03-09 NOTE — HPI
Claudia Chris is a 64 y.o. female diagnosed w/ crohn's disease in August of 2024 with known small bowel strictures who presented to the ED last night with vomiting x 5 days and worsening abdominal pain.  She states that for the past 5 days she has vomited 2x / day, ad that she has been having diarrhea, and worsening pain. She notes that normally if she sticks to a liquid diet for a few days her pain subsides, but that this time she was on a liquid diet for 3 days without relief.   Upon presentation to the ED she was afebrile, VSS.  Labs were WNL.  CT scan showed moderate segment small bowel wall thickening. Suspected stenosis of the small bowel produces significant pre stenotic dilation and upstream dilation with air-fluid levels concerning for small bowel obstruction. She is still passing flatus and having bowel function.      Regarding her Crohn's disease she had multiple admissions last year for sBOs which resolved without surgical intervention as well as intermittent diarrhea for several years with up to 3-4X per day. Other times, has difficulty evacuating and will sit on the toilet for long periods of time. Has had intermittent blood in stool. Has mild nausea.. CT scan on 7/30/24 showed multiple distended SB loops, two focal areas of narrowing in small bowel, PillCam in small bowel (which she swallowed in 2017).  She was seen by GI at [place] and there was concern for IBD.  She was referred to [facility] for balloon enteroscopy which she had on 8/26/24 with removal of PillCam from proximal jejunum and noted small bowel changes concerning for Crohn's disease. She was started on a Prednisone taper which improved her symptoms and then eventually on Humira.  She was seen by Dr. Hair Kevin with colorectal surgery at [facility] on 12/11/2024 due to the imaging with evidence of chronic stricturing.  He obtained MRI enterography which showed sequelae of acute on chronic Crohn's disease with several segments of  active inflammation in the proximal and distal small bowel with associated mild stricturing. , the plan at that time was medical optimization with the knowledge that she would likely require surgery for some of this stricturing in the future but with the goal of minimizing the amount of bowel resected.  She initially had some issues with taking the Humira but reports that she has been taking it.
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
## 2025-03-09 NOTE — SUBJECTIVE & OBJECTIVE
Interval History: reports abdominal pain has improved today. Diet advanced to clears. She was undecided on morning labs, but agreeable this AM. No more diarrhea.    Review of Systems   Constitutional:  Negative for activity change, diaphoresis, fatigue and unexpected weight change.   HENT:  Negative for congestion, ear pain and sore throat.    Eyes: Negative.    Respiratory:  Negative for shortness of breath and wheezing.    Cardiovascular:  Negative for chest pain and palpitations.   Gastrointestinal:  Positive for abdominal pain, nausea and vomiting. Negative for constipation and diarrhea.   Endocrine: Negative.    Genitourinary:  Negative for flank pain, hematuria and urgency.   Musculoskeletal:  Negative for joint swelling and neck pain.   Skin:  Negative for pallor.   Neurological:  Negative for seizures, syncope and light-headedness.   Hematological: Negative.    Psychiatric/Behavioral: Negative.       Objective:     Vital Signs (Most Recent):  Temp: 98.3 °F (36.8 °C) (03/09/25 0734)  Pulse: 85 (03/09/25 0734)  Resp: 18 (03/09/25 0734)  BP: (!) 140/71 (03/09/25 0734)  SpO2: 95 % (03/09/25 0734) Vital Signs (24h Range):  Temp:  [97.9 °F (36.6 °C)-98.7 °F (37.1 °C)] 98.3 °F (36.8 °C)  Pulse:  [] 85  Resp:  [18-22] 18  SpO2:  [95 %-100 %] 95 %  BP: (126-154)/(57-80) 140/71     Weight: 88 kg (194 lb 0.1 oz)  Body mass index is 28.64 kg/m².    Intake/Output Summary (Last 24 hours) at 3/9/2025 1312  Last data filed at 3/9/2025 0504  Gross per 24 hour   Intake 319.8 ml   Output --   Net 319.8 ml         Physical Exam  Constitutional:       Appearance: She is well-developed.   HENT:      Head: Normocephalic and atraumatic.   Cardiovascular:      Rate and Rhythm: Normal rate and regular rhythm.      Heart sounds: Normal heart sounds. No murmur heard.  Pulmonary:      Effort: Pulmonary effort is normal. No respiratory distress.      Breath sounds: Normal breath sounds.   Abdominal:      General: Bowel sounds are  normal. There is distension.      Palpations: Abdomen is soft.      Tenderness: There is abdominal tenderness (improving).   Musculoskeletal:         General: Normal range of motion.      Cervical back: Normal range of motion and neck supple.   Skin:     General: Skin is warm and dry.   Neurological:      Mental Status: She is alert and oriented to person, place, and time.             Significant Labs: All pertinent labs within the past 24 hours have been reviewed.  CBC:   Recent Labs   Lab 03/08/25  1508 03/09/25  0918   WBC 6.20 4.96   HGB 12.6 10.4*   HCT 40.2 34.8*   * 522*     CMP:   Recent Labs   Lab 03/08/25  1555 03/09/25  0918    138   K 3.5 3.9    106   CO2 20* 22*   GLU 97 78   BUN 13 13   CREATININE 0.8 0.8   CALCIUM 9.0 8.3*   PROT 6.4 5.2*   ALBUMIN 2.8* 2.6*   BILITOT 0.3 0.4   ALKPHOS 74 65   AST 14 11   ALT 11 10   ANIONGAP 13 10       Significant Imaging:   Imaging Results               CT Abdomen Pelvis  Without Contrast (Final result)  Result time 03/09/25 08:45:53      Final result by Chepe Villarreal MD (03/09/25 08:45:53)                   Impression:      Findings concerning for small bowel obstruction likely as the sequela of underlying acute or remote infectious or inflammatory enteritis.  Crohn's disease would be a consideration.  Consider gastroenterology consultation.    This report was flagged in Epic as abnormal.  Findings related to the clinical team via electronic message system with acknowledgement of receipt.    All CT scans at this facility are performed  using dose modulation techniques as appropriate to performed exam including the following:  automated exposure control; adjustment of mA and/or kV according to the patients size (this includes techniques or standardized protocols for targeted exams where dose is matched to indication/reason for exam: i.e. extremities or head);  iterative reconstruction technique.      Electronically signed by: Chepe  Cherelle  Date:    03/09/2025  Time:    08:45               Narrative:    EXAMINATION:  CT ABDOMEN PELVIS WITHOUT CONTRAST    CLINICAL HISTORY:  Abdominal pain, post-op;    TECHNIQUE:  Low dose axial images, sagittal and coronal reformations were obtained from the lung bases to the pubic symphysis.  Contrast was not administered.    COMPARISON:  Multiple    FINDINGS:  Heart: Normal in size. No pericardial effusion.    Lung Bases: Well aerated, without consolidation or pleural fluid.    Liver: Normal in size and attenuation, with no focal hepatic lesions.    Gallbladder: No calcified gallstones.    Bile Ducts: No evidence of dilated ducts.    Pancreas: No mass or peripancreatic fat stranding.    Spleen: Unremarkable.    Adrenals: Unremarkable.    Kidneys/ Ureters: Unremarkable.    Bladder: No evidence of wall thickening.    Reproductive organs: Uterus is surgically absent.    GI Tract/Mesentery: Moderate segment small bowel wall thickening.  Suspected stenosis of the small bowel produces significant pre stenotic dilation and upstream dilation with air-fluid levels concerning for small bowel obstruction.  This could be superimposed infectious or inflammatory enteritis on sequela of prior inflammatory enteritis.  Crohn's disease would be a primary consideration.    Peritoneal Space: No ascites. No free air.    Retroperitoneum: No significant adenopathy.    Abdominal wall: Unremarkable.    Vasculature: No significant atherosclerosis or aneurysm.    Bones: No acute fracture.    It is unclear when images became available for dictation.                                       X-Ray Abdomen AP 1 View (KUB) (Final result)  Result time 03/08/25 15:34:42      Final result by Tamir Liz MD (03/08/25 15:34:42)                   Impression:     Unremarkable exam.    Finalized on: 3/8/2025 3:34 PM By:  Tamir Liz MD  [facility]# [id number]      2025-03-08 15:36:47.335     [facility]               Narrative:    EXAM: XR ABDOMEN AP 1  VIEW    CLINICAL HISTORY:   [R10.9]-Unspecified abdominal pain.    COMPARISON: None available.    FINDINGS: Bowel gas pattern appears normal.  No abnormal calcifications.    No acute osseous finding.
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
## 2025-03-09 NOTE — PLAN OF CARE
IVF infusing. Diet tolerated. Updated patient on plan of care. Instructed patient to use call light for assistance, call light in reach. Hourly rounding performed. Vitals q4 hours. Education provided, questions answered/encouraged. Chart check complete.     Problem: Adult Inpatient Plan of Care  Goal: Plan of Care Review  Outcome: Progressing  Goal: Patient-Specific Goal (Individualized)  Outcome: Progressing  Goal: Absence of Hospital-Acquired Illness or Injury  Outcome: Progressing  Goal: Optimal Comfort and Wellbeing  Outcome: Progressing  Goal: Readiness for Transition of Care  Outcome: Progressing

## 2025-03-09 NOTE — PLAN OF CARE
[facility]  Initial Discharge Assessment       Primary Care Provider: No, Primary Doctor    Admission Diagnosis: Small bowel obstruction [K56.609]  Abdominal pain [R10.9]  Chest pain [R07.9]    Admission Date: 3/8/2025  Expected Discharge Date:     Transition of Care Barriers: None    Payor: MEDICAID / Plan: [employer] / Product Type: Managed Medicaid /     Extended Emergency Contact Information  Primary Emergency Contact: Janay Gonzales  Address: [address] [place] [address]            [place] [address] [place]  Mobile Phone: [phone]  Relation: Daughter    Discharge Plan A: Home         [facility] [address] [address] [address]  Phone: [phone] Fax: [phone]    [id number] [place] [facility] [address] [address]  Phone: [phone] Fax: [phone]      Initial Assessment (most recent)       Adult Discharge Assessment - 03/09/25 1015          Discharge Assessment    Assessment Type Discharge Planning Assessment     Confirmed/corrected address, phone number and insurance Yes     Confirmed Demographics Correct on Facesheet     Source of Information patient     Does patient/caregiver understand observation status Yes     Communicated GEOFFREY with patient/caregiver Date not available/Unable to determine     People in Home alone     Do you expect to return to your current living situation? No     Do you have help at home or someone to help you manage your care at home? No     Prior to hospitilization cognitive status: Alert/Oriented     Current cognitive status: Alert/Oriented     Walking or Climbing Stairs Difficulty no     Dressing/Bathing Difficulty no     Equipment Currently Used at Home none     Readmission within 30 days? No     Patient currently being followed by outpatient case management? No     Do you currently have service(s) that help you manage your care  at home? No     Do you take prescription medications? Yes     Do you have prescription coverage? Yes     Do you have any problems affording any of your prescribed medications? No     Is the patient taking medications as prescribed? yes     Who is going to help you get home at discharge? family     How do you get to doctors appointments? car, drives self     Are you on dialysis? No     Do you take coumadin? No     Discharge Plan A Home     DME Needed Upon Discharge  none     Discharge Plan discussed with: Patient     Transition of Care Barriers None

## 2025-03-09 NOTE — HOSPITAL COURSE
Claudia Chris is a 64 year old female who was admitted for Crohn's disease exacerbation. She presented with N/V/D and abdominal pain and was noted to have findings concerning for small bowel obstruction related to inflammatory enteritis on imaging. GI and General Surgery are assisting with care. She received scheduled IV corticosteroids to reduce inflammation. Inflammatory markers elevated. She was placed on bowel rest and gentle hydration overnight.  During hospital course, patient reported resolution of symptoms with passing of flatus and multiple normal bowel movements.  She was able to to be advanced in her diet and was able to tolerate a diet without any abdominal symptoms reported.  Discussed with Gastroenterology, recommendations for outpatient taper of prednisone 40mg daily x 10 days then 30mg daily x 10 days then 20mg daily x 10 days then 10mg daily x 10 days then 5mg daily x 10 days.     Discharge plan of care was discussed at length with patient including patient's need for close outpatient follow-up. Instructed on close outpatient follow up with Gastroenterology and Colorectal surgery for further evaluation. Instructed on PCP follow up within 1 week with repeat lab work to ensure normalization, follow up of pending labs, or any further evaluation as necessitated. All questions and concerns were answered. Return precautions provided.  Patient instructed to return to the hospital or seek medical care if baseline status should suddenly change, return of symptoms occurs, or for any new or concerning symptoms that arise.  Patient was in agreement with plan of care going forward. Patient continued to remain afebrile, hemodynamically stable, able to tolerate diet. Patient seen and examined on the day of discharge. Patient deemed stable for discharge.
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
## 2025-03-09 NOTE — NURSING
Patient refusing morning labs. Call team aware and notified. Educated patient on importance of reviewing labs.

## 2025-03-09 NOTE — ASSESSMENT & PLAN NOTE
Pt w/ known Crohn's disease and chronic stricturing.  Still having bowel function at this time. Would recommend trial of CLD to see if she tolerates it.      -- no acute surgical intervention   -- recommend continuing biologics  -- agree with GI evalution   -- recommend f/u with Dr. Kevin, will have her evaluated by one of our colorectal surgeons tomorrow   -- remainder of care as per primary team

## 2025-03-09 NOTE — HPI
"Claudia Chris is a 64 year old female with history of GERD, HTN, and recently diagnosed extensive Crohn's Disease who presents to ED for further evaluation of abdominal pain. She is followed by  Dr. Kevin at [facility] for Crohn's disease who placed her on Humira every 2 weeks about two months ago. She has been compliant with injections and denies symptoms worsening symptoms until 5 days ago. She reports increased abdominal fullness and sharpness with associated nausea, diarrhea, and vomiting. She denies fever, chills, chest pains, or palpitations.     In ED, labs were significant for thrombocytosis. Per ED Staff "CT scan multifocal small bowel wall thickening with strictures causing SBO". General Surgery and GI were both consulted. ESR and CRP ordered. She will be placed on bowel rest and admitted to  for further management.   "

## 2025-03-09 NOTE — PROGRESS NOTES
"[facility] - Med Surg [address] [place] [address] Medicine  Progress Note    Patient Name: Claudia Chris  MRN: [id number]  Patient Class: IP- Inpatient   Admission Date: 3/8/2025  Length of Stay: 1 days  Attending Physician: Isai Ascencio DO  Primary Care Provider: Obdulia, Primary Doctor    Subjective     Principal Problem:Crohn's disease of small intestine with intestinal obstruction        HPI:  Claudia Chris is a 64 year old female with history of GERD, HTN, and recently diagnosed extensive Crohn's Disease who presents to ED for further evaluation of abdominal pain. She is followed by  Dr. Kevin at [facility] for Crohn's disease who placed her on Humira every 2 weeks about two months ago. She has been compliant with injections and denies symptoms worsening symptoms until 5 days ago. She reports increased abdominal fullness and sharpness with associated nausea, diarrhea, and vomiting. She denies fever, chills, chest pains, or palpitations.     In ED, labs were significant for thrombocytosis. Per ED Staff "CT scan multifocal small bowel wall thickening with strictures causing SBO". General Surgery and GI were both consulted. ESR and CRP ordered. She will be placed on bowel rest and admitted to  for further management.     Overview/Hospital Course:  Claudia Chris is a 64 year old female who was admitted for Crohn's disease exacerbation. She presented with N/V/D and abdominal pain and was noted to have findings concerning for small bowel obstruction related to inflammatory enteritis on imaging. She received scheduled corticosteroids to reduce inflammation. She was placed on bowel rest and gentle hydration overnight. Will trend inflammatory markers and abdominal films. GI and General Surgery are assisting with care.     Interval History: reports abdominal pain has improved today. Diet advanced to clears. She was undecided on morning labs, but agreeable this AM. No more diarrhea.    Review of Systems   Constitutional:  Negative for " activity change, diaphoresis, fatigue and unexpected weight change.   HENT:  Negative for congestion, ear pain and sore throat.    Eyes: Negative.    Respiratory:  Negative for shortness of breath and wheezing.    Cardiovascular:  Negative for chest pain and palpitations.   Gastrointestinal:  Positive for abdominal pain, nausea and vomiting. Negative for constipation and diarrhea.   Endocrine: Negative.    Genitourinary:  Negative for flank pain, hematuria and urgency.   Musculoskeletal:  Negative for joint swelling and neck pain.   Skin:  Negative for pallor.   Neurological:  Negative for seizures, syncope and light-headedness.   Hematological: Negative.    Psychiatric/Behavioral: Negative.       Objective:     Vital Signs (Most Recent):  Temp: 98.3 °F (36.8 °C) (03/09/25 0734)  Pulse: 85 (03/09/25 0734)  Resp: 18 (03/09/25 0734)  BP: (!) 140/71 (03/09/25 0734)  SpO2: 95 % (03/09/25 0734) Vital Signs (24h Range):  Temp:  [97.9 °F (36.6 °C)-98.7 °F (37.1 °C)] 98.3 °F (36.8 °C)  Pulse:  [] 85  Resp:  [18-22] 18  SpO2:  [95 %-100 %] 95 %  BP: (126-154)/(57-80) 140/71     Weight: 88 kg (194 lb 0.1 oz)  Body mass index is 28.64 kg/m².    Intake/Output Summary (Last 24 hours) at 3/9/2025 1312  Last data filed at 3/9/2025 0504  Gross per 24 hour   Intake 319.8 ml   Output --   Net 319.8 ml         Physical Exam  Constitutional:       Appearance: She is well-developed.   HENT:      Head: Normocephalic and atraumatic.   Cardiovascular:      Rate and Rhythm: Normal rate and regular rhythm.      Heart sounds: Normal heart sounds. No murmur heard.  Pulmonary:      Effort: Pulmonary effort is normal. No respiratory distress.      Breath sounds: Normal breath sounds.   Abdominal:      General: Bowel sounds are normal. There is distension.      Palpations: Abdomen is soft.      Tenderness: There is abdominal tenderness (improving).   Musculoskeletal:         General: Normal range of motion.      Cervical back: Normal range  of motion and neck supple.   Skin:     General: Skin is warm and dry.   Neurological:      Mental Status: She is alert and oriented to person, place, and time.             Significant Labs: All pertinent labs within the past 24 hours have been reviewed.  CBC:   Recent Labs   Lab 03/08/25  1508 03/09/25 0918   WBC 6.20 4.96   HGB 12.6 10.4*   HCT 40.2 34.8*   * 522*     CMP:   Recent Labs   Lab 03/08/25  1555 03/09/25 0918    138   K 3.5 3.9    106   CO2 20* 22*   GLU 97 78   BUN 13 13   CREATININE 0.8 0.8   CALCIUM 9.0 8.3*   PROT 6.4 5.2*   ALBUMIN 2.8* 2.6*   BILITOT 0.3 0.4   ALKPHOS 74 65   AST 14 11   ALT 11 10   ANIONGAP 13 10       Significant Imaging:   Imaging Results               CT Abdomen Pelvis  Without Contrast (Final result)  Result time 03/09/25 08:45:53      Final result by Chepe Villarreal MD (03/09/25 08:45:53)                   Impression:      Findings concerning for small bowel obstruction likely as the sequela of underlying acute or remote infectious or inflammatory enteritis.  Crohn's disease would be a consideration.  Consider gastroenterology consultation.    This report was flagged in Epic as abnormal.  Findings related to the clinical team via electronic message system with acknowledgement of receipt.    All CT scans at this facility are performed  using dose modulation techniques as appropriate to performed exam including the following:  automated exposure control; adjustment of mA and/or kV according to the patients size (this includes techniques or standardized protocols for targeted exams where dose is matched to indication/reason for exam: i.e. extremities or head);  iterative reconstruction technique.      Electronically signed by: Chepe Villarreal  Date:    03/09/2025  Time:    08:45               Narrative:    EXAMINATION:  CT ABDOMEN PELVIS WITHOUT CONTRAST    CLINICAL HISTORY:  Abdominal pain, post-op;    TECHNIQUE:  Low dose axial images, sagittal and  coronal reformations were obtained from the lung bases to the pubic symphysis.  Contrast was not administered.    COMPARISON:  Multiple    FINDINGS:  Heart: Normal in size. No pericardial effusion.    Lung Bases: Well aerated, without consolidation or pleural fluid.    Liver: Normal in size and attenuation, with no focal hepatic lesions.    Gallbladder: No calcified gallstones.    Bile Ducts: No evidence of dilated ducts.    Pancreas: No mass or peripancreatic fat stranding.    Spleen: Unremarkable.    Adrenals: Unremarkable.    Kidneys/ Ureters: Unremarkable.    Bladder: No evidence of wall thickening.    Reproductive organs: Uterus is surgically absent.    GI Tract/Mesentery: Moderate segment small bowel wall thickening.  Suspected stenosis of the small bowel produces significant pre stenotic dilation and upstream dilation with air-fluid levels concerning for small bowel obstruction.  This could be superimposed infectious or inflammatory enteritis on sequela of prior inflammatory enteritis.  Crohn's disease would be a primary consideration.    Peritoneal Space: No ascites. No free air.    Retroperitoneum: No significant adenopathy.    Abdominal wall: Unremarkable.    Vasculature: No significant atherosclerosis or aneurysm.    Bones: No acute fracture.    It is unclear when images became available for dictation.                                       X-Ray Abdomen AP 1 View (KUB) (Final result)  Result time 03/08/25 15:34:42      Final result by Tamir Liz MD (03/08/25 15:34:42)                   Impression:     Unremarkable exam.    Finalized on: 3/8/2025 3:34 PM By:  Tamir Liz MD  [facility]# [id number]      2025-03-08 15:36:47.335     [facility]               Narrative:    EXAM: XR ABDOMEN AP 1 VIEW    CLINICAL HISTORY:   [R10.9]-Unspecified abdominal pain.    COMPARISON: None available.    FINDINGS: Bowel gas pattern appears normal.  No abnormal calcifications.    No acute osseous finding.                      "                       Assessment & Plan  Crohn's disease of small intestine with intestinal obstruction  Hx of Chron's disease.   -CT imaging 10/09/24 noted: "1. Marked dilatation of multiple proximal small bowel loops compatible with Crohn's disease. There is one focal area of stenosis as described where there is a very prominent area of prestenotic dilatation, and another area of post stenotic dilatation. More proximal to this area, there are some apparent areas of stenosis within the affected small bowel. "  -Evaluated by GI(Dr. Lopez) on 12/04/2024 where evaluation noted concerns for noncompliance with Humira.    -Further evaluated by Colorectal surgery (Dr. Kevin) on 12/11/24 where evaluation noted no indication for surgical intervention at the time based on evaluation of CT imaging.  Follow up MR enterography was completed which noted "Findings concerning for sequelae of acute on chronic Crohn's disease with several segments of active inflammation in the proximal and distal small bowel with associated mild stricturing. "  -Presented to the ED this admission for concerns of worsening abdominal pain with associated nausea/vomiting. Reports recent compliance with Humira every 2 weeks. Last dose 2/27/25.  Per ED staff, CT imaging noted concerns for "multifocal small bowel wall thickening with strictures causing SBO. Most discrete transition point to the paramedian deep lower abdomen. "    PLAN:  - NPO  - Started on IV fluids with NS  - Empiric antibiotics coverage with piperacillin-tazobactam  - Followup ESR/CRP labs  - Pain control with analgesics PRN. Limit opioids to prevent worsening of SBO.  - Antiemetics PRN.   - Replete electrolytes as necessitated  - General Surgery Consulted, follow-up recs  - Gastroenterology consulted, follow-up recs    3/8/25  Gradually improving; advance to liquid diet today  Low suspicion for infection, will stop Zosyn. Continue steroid taper. Follow ESR and CRP trends. Abdominal " "imaging in AM.       GERD (gastroesophageal reflux disease)  Not on any home meds currently, was previously on pantoprazole per Rx fill history. Per chart review, prior trial of Famotidine stopped due to concerns for diarrhea.     PLAN:  Initiate  PPI if concerns for GERD exacerbation  Primary osteoarthritis of left knee  Chronic, follows with outpatient providers with recent imaging in 02/2025    PLAN:  Multimodal pain regimen PRN  Small bowel stricture  See "Crohn's disease of small intestine with intestinal obstruction " A&P      VTE Risk Mitigation (From admission, onward)           Ordered     IP VTE LOW RISK PATIENT  Once         03/08/25 1733     Place sequential compression device  Until discontinued         03/08/25 1733                    Discharge Planning   GEOFFREY:      Code Status: Full Code   Medical Readiness for Discharge Date:   Discharge Plan A: Teo Gallardo NP  Department of Hospital Medicine   [facility] Med [facility] 3    "

## 2025-03-09 NOTE — ASSESSMENT & PLAN NOTE
"Hx of Chron's disease.   -CT imaging 10/09/24 noted: "1. Marked dilatation of multiple proximal small bowel loops compatible with Crohn's disease. There is one focal area of stenosis as described where there is a very prominent area of prestenotic dilatation, and another area of post stenotic dilatation. More proximal to this area, there are some apparent areas of stenosis within the affected small bowel. "  -Evaluated by GI(Dr. Lopez) on 12/04/2024 where evaluation noted concerns for noncompliance with Humira.    -Further evaluated by Colorectal surgery (Dr. Kevin) on 12/11/24 where evaluation noted no indication for surgical intervention at the time based on evaluation of CT imaging.  Follow up MR enterography was completed which noted "Findings concerning for sequelae of acute on chronic Crohn's disease with several segments of active inflammation in the proximal and distal small bowel with associated mild stricturing. "  -Presented to the ED this admission for concerns of worsening abdominal pain with associated nausea/vomiting. Reports recent compliance with Humira every 2 weeks. Last dose 2/27/25.  Per ED staff, CT imaging noted concerns for "multifocal small bowel wall thickening with strictures causing SBO. Most discrete transition point to the paramedian deep lower abdomen. "    PLAN:  - NPO  - Started on IV fluids with NS  - Empiric antibiotics coverage with piperacillin-tazobactam  - Followup ESR/CRP labs  - Pain control with analgesics PRN. Limit opioids to prevent worsening of SBO.  - Antiemetics PRN.   - Replete electrolytes as necessitated  - General Surgery Consulted, follow-up recs  - Gastroenterology consulted, follow-up recs    3/8/25  Gradually improving; advance to liquid diet today  Low suspicion for infection, will stop Zosyn. Continue steroid taper. Follow ESR and CRP trends. Abdominal imaging in AM.       "

## 2025-03-09 NOTE — ASSESSMENT & PLAN NOTE
Home meds for hypertension were reviewed and noted below.   Home Hypertension Medications per chart review             amLODIPine (NORVASC) 2.5 MG tablet Take 2.5 mg by mouth.                 Patients blood pressure range in the last 24 hours was: BP  Min: 126/57  Max: 154/70.      PLAN:  -While in the hospital, will manage blood pressure as follows; Continue home antihypertensive regimen when able to tolerate PO  -The patient's inpatient anti-hypertensive regimen is listed below:  Current Antihypertensives  , Daily, Oral  hydrALAZINE injection 5 mg, Every 6 hours PRN, Intravenous  -Will utilize p.r.n. blood pressure medication only if patient's blood pressure greater than 180/110 and she develops symptoms such as worsening chest pain or shortness of breath.

## 2025-03-10 VITALS
HEIGHT: 69 IN | TEMPERATURE: 98 F | DIASTOLIC BLOOD PRESSURE: 61 MMHG | WEIGHT: 195.56 LBS | BODY MASS INDEX: 28.96 KG/M2 | SYSTOLIC BLOOD PRESSURE: 118 MMHG | RESPIRATION RATE: 18 BRPM | HEART RATE: 104 BPM | OXYGEN SATURATION: 100 %

## 2025-03-10 LAB
ALBUMIN SERPL BCP-MCNC: 2.6 G/DL (ref 3.5–5.2)
ALP SERPL-CCNC: 67 U/L (ref 40–150)
ALT SERPL W/O P-5'-P-CCNC: 10 U/L (ref 10–44)
ANION GAP SERPL CALC-SCNC: 9 MMOL/L (ref 8–16)
AST SERPL-CCNC: 12 U/L (ref 10–40)
BASOPHILS # BLD AUTO: 0.06 K/UL (ref 0–0.2)
BASOPHILS NFR BLD: 1 % (ref 0–1.9)
BILIRUB SERPL-MCNC: 0.3 MG/DL (ref 0.1–1)
BUN SERPL-MCNC: 10 MG/DL (ref 8–23)
CALCIUM SERPL-MCNC: 8.7 MG/DL (ref 8.7–10.5)
CHLORIDE SERPL-SCNC: 108 MMOL/L (ref 95–110)
CO2 SERPL-SCNC: 22 MMOL/L (ref 23–29)
CREAT SERPL-MCNC: 0.8 MG/DL (ref 0.5–1.4)
CRP SERPL-MCNC: 74.23 MG/L (ref 0–3.19)
DIFFERENTIAL METHOD BLD: ABNORMAL
EOSINOPHIL # BLD AUTO: 0 K/UL (ref 0–0.5)
EOSINOPHIL NFR BLD: 0.5 % (ref 0–8)
ERYTHROCYTE [DISTWIDTH] IN BLOOD BY AUTOMATED COUNT: 14.4 % (ref 11.5–14.5)
EST. GFR  (NO RACE VARIABLE): >60 ML/MIN/1.73 M^2
GLUCOSE SERPL-MCNC: 95 MG/DL (ref 70–110)
HCT VFR BLD AUTO: 35.7 % (ref 37–48.5)
HGB BLD-MCNC: 10.7 G/DL (ref 12–16)
IMM GRANULOCYTES # BLD AUTO: 0.24 K/UL (ref 0–0.04)
IMM GRANULOCYTES NFR BLD AUTO: 3.8 % (ref 0–0.5)
LYMPHOCYTES # BLD AUTO: 1.2 K/UL (ref 1–4.8)
LYMPHOCYTES NFR BLD: 18.4 % (ref 18–48)
MAGNESIUM SERPL-MCNC: 2.1 MG/DL (ref 1.6–2.6)
MCH RBC QN AUTO: 26.2 PG (ref 27–31)
MCHC RBC AUTO-ENTMCNC: 30 G/DL (ref 32–36)
MCV RBC AUTO: 87 FL (ref 82–98)
MONOCYTES # BLD AUTO: 0.8 K/UL (ref 0.3–1)
MONOCYTES NFR BLD: 13.3 % (ref 4–15)
NEUTROPHILS # BLD AUTO: 4 K/UL (ref 1.8–7.7)
NEUTROPHILS NFR BLD: 63 % (ref 38–73)
NRBC BLD-RTO: 0 /100 WBC
PHOSPHATE SERPL-MCNC: 2.5 MG/DL (ref 2.7–4.5)
PLATELET # BLD AUTO: 596 K/UL (ref 150–450)
PMV BLD AUTO: 8.5 FL (ref 9.2–12.9)
POTASSIUM SERPL-SCNC: 3.5 MMOL/L (ref 3.5–5.1)
PROT SERPL-MCNC: 5.8 G/DL (ref 6–8.4)
RBC # BLD AUTO: 4.09 M/UL (ref 4–5.4)
SODIUM SERPL-SCNC: 139 MMOL/L (ref 136–145)
WBC # BLD AUTO: 6.31 K/UL (ref 3.9–12.7)

## 2025-03-10 PROCEDURE — 84100 ASSAY OF PHOSPHORUS: CPT | Performed by: STUDENT IN AN ORGANIZED HEALTH CARE EDUCATION/TRAINING PROGRAM

## 2025-03-10 PROCEDURE — 80053 COMPREHEN METABOLIC PANEL: CPT | Performed by: STUDENT IN AN ORGANIZED HEALTH CARE EDUCATION/TRAINING PROGRAM

## 2025-03-10 PROCEDURE — 83735 ASSAY OF MAGNESIUM: CPT | Performed by: STUDENT IN AN ORGANIZED HEALTH CARE EDUCATION/TRAINING PROGRAM

## 2025-03-10 PROCEDURE — 25000003 PHARM REV CODE 250: Performed by: STUDENT IN AN ORGANIZED HEALTH CARE EDUCATION/TRAINING PROGRAM

## 2025-03-10 PROCEDURE — 36415 COLL VENOUS BLD VENIPUNCTURE: CPT | Performed by: INTERNAL MEDICINE

## 2025-03-10 PROCEDURE — 99232 SBSQ HOSP IP/OBS MODERATE 35: CPT | Mod: ,,, | Performed by: STUDENT IN AN ORGANIZED HEALTH CARE EDUCATION/TRAINING PROGRAM

## 2025-03-10 PROCEDURE — 85025 COMPLETE CBC W/AUTO DIFF WBC: CPT | Performed by: STUDENT IN AN ORGANIZED HEALTH CARE EDUCATION/TRAINING PROGRAM

## 2025-03-10 PROCEDURE — 86141 C-REACTIVE PROTEIN HS: CPT | Performed by: INTERNAL MEDICINE

## 2025-03-10 RX ORDER — PREDNISONE 10 MG/1
TABLET ORAL
Qty: 105 TABLET | Refills: 0 | Status: SHIPPED | OUTPATIENT
Start: 2025-03-11 | End: 2025-04-30

## 2025-03-10 RX ORDER — ADALIMUMAB 40MG/0.4ML
40 KIT SUBCUTANEOUS
COMMUNITY
Start: 2025-03-06

## 2025-03-10 RX ORDER — SODIUM,POTASSIUM PHOSPHATES 280-250MG
2 POWDER IN PACKET (EA) ORAL EVERY 4 HOURS
Status: DISCONTINUED | OUTPATIENT
Start: 2025-03-10 | End: 2025-03-10

## 2025-03-10 RX ADMIN — Medication 2 PACKET: at 09:03
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
## 2025-03-10 NOTE — ASSESSMENT & PLAN NOTE
Hx intermittent intestinal obstructions since 2017  Stricturing and stenotic disease by imaging  Previous steroid use for flares  Humira since 11/2024  , ESR 47  Passing flatus and diarrhea  Followed by Dr. Kevin, [facility]  Followed by Dr. Lopez, GI at [facility]
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
## 2025-03-10 NOTE — PLAN OF CARE
[facility]  Discharge Final Note    Primary Care Provider: Mavis Craig FNP    Expected Discharge Date: 3/10/2025    Final Discharge Note (most recent)       Final Note - 03/10/25 1045          Final Note    Assessment Type Final Discharge Note     Anticipated Discharge Disposition Home or Self Care        Post-Acute Status    Discharge Delays None known at this time                     Important Message from Medicare             Contact Info       Ritu Lopez MD    [address] [facility] [facility]   [address]   [place] [id number] [phone] (Work)   [phone] (Fax)       Next Steps: Schedule an appointment as soon as possible for a visit    Instructions: Please make sure to have close outpatient follow up with your gastroenterologist for further evaluation.    Hair Kevin MD   Specialty: Colon and Rectal Surgery    [facility]  [address] [place] [address], [address]  [facility] [address]   Phone: [phone]       Next Steps: Schedule an appointment as soon as possible for a visit    Instructions: Please make sure to have close outpatient follow up with your colorectal surgeon for further evaluation.    Mavis Craig FNP   Specialty: Family Medicine   Relationship: PCP - General    [address] [facility] [address]   Phone: [phone]       Next Steps: Schedule an appointment as soon as possible for a visit in 1 week(s)    Instructions: Your labs remained stable, but some labs still remained abnormal. As we discussed, it is extremely important for you to followup with a primary care physician within 1 week for repeat lab work to ensure normalization, follow up of pending labs, medication adjustments/refills, routine post-discharge care, and any other evaluations as necessitated., Post hospital followup          Discharge home, no home health or dme orders noted.  [facility]

## 2025-03-10 NOTE — ASSESSMENT & PLAN NOTE
Pt w/ known Crohn's disease and chronic stricturing.  Reports abdominal pain, nausea and vomiting improved    -- no acute surgical intervention   -- recommend continuing biologics and steroid taper  -- advanced to low residue diet  -- appreciate GI recs  -- patient plans to follow up with Dr. Lopez as well as Dr. Kevin.  I discussed with her extensively that she would likely need surgery in the future possibly prior to switching biologics.  She does have severe stricturing disease  -- okay for discharge from surgery standpoint

## 2025-03-10 NOTE — HPI
64 y.o female with formal diagnosis of Crohn's disease in July 2024 after undergoing a device assisted enteroscopy with foreign body removal. She was found to have a Pill Cam in the proximal jejunum that was removed with Forte net. Once removed there was an area of severe, diffuse inflammation, characterized by congestion (edema), erythema, friability, granularity, mucus, confluent ulcerations and deep ulcerations with severe stenosis. Path confirmed chronic enteritis with severe activity, ulceration and granulation tissue. No inclusion bodies or granulomas were seen. Patient endorsed a history of intermittent small bowel obstructions since 2017 with previous investigations in [place], [place]. Patient reported the PillCam was placed back in 2017 while residing in [place]. Those outside records are not available for review.    She is followed by Dr. Ritu Lopez at [place] and LIOR Ghosh at [facility]. She began Humira after being found to have stricturing disease in 11/2024. She tolerated induction doses followed by maintenance dose of 40mg Q 2 weeks. Most recent dose 2/27/25. Despite compliance on the biologic she presented to the ED yesterday with 5 day history of nausea, vomiting, diarrhea and abdominal pain. In the ED, vitals were stable with no leukocytosis or fever. Albumin low at 2.8. ESR 47 and . Imaging showed moderate segment small bowel wall thickening. There was suspected stenosis of the small bowel that produces significant pre stenotic dilation and upstream dilation with air-fluid levels concerning for small bowel obstruction. Patient was admitted for further management of her Crohn's flare. Patient see this morning. She appears comfortable, able to stand and ambulate about the room without difficulty. She endorses flatus and non-bloody liquid stools. She does see occasional blood when wiping on the toilet paper only. Also reports audible bowel sounds, gas and bloating. States she has been placed on  steroids in the past to manage her Crohn's flares.     Last seen by CRS Dr. Kevin 12/2024 who noted imaging from 10/2024 demonstrated showed multiple small bowel strictures and a moderate stricture in the small bowel. Subsequent MRE on 12/30/24 showed several dilated loops of small bowel with several focal areas of circumferential wall thickening with enhancement and restricted diffusion with luminal narrowing. In the jejunum there is a 7.7 cm segment of wall thickening and enhancement with luminal narrowing and mild upstream dilatation suggesting stricture. Additional loop of small bowel in the lower abdomen measuring 6 cm with luminal narrowing and upstream bowel dilatation with associated wall thickening, enhancement and mild restricted diffusion. Dr. Kevin felt this patient would eventually need surgical intervention and recommended follow up to discuss surgical options if she became symptomatic.    Last colonoscopy 09/2024. Prep reported with semi-liquid stool noted throughout colon. Adequate visualization accomplished with lavage and suction.     Digital Rectal Examination - External Hemorrhoids  Rectum - Internal Hemorrhoids  Sigmoid colon - Unremarkable.  Descending colon - Unremarkable.  Transverse Colon - Unremarkable.  Ascending - Unremarkable.  Cecum -  Unremarkable.  Terminal Ileum - Unremarkable.    Path revealed:   1. Terminal ileum, biopsy:                                                 - Terminal ileum mucosa with normal underlying lymphoid tissue          and no significant histopathologic abnormalities, benign.               - No significant inflammation, dysplasia or malignancy                  identified.                                                            2. Right colon, biopsy:                                                    - Colonic mucosa with no significant histopathologic                    abnormalities, benign.                                                  - No significant  inflammation, dysplasia or malignancy                  identified.                                                            3. Transverse colon polyp:                                                 - Polypoid colonic mucosa with no significant histopathologic           abnormalities, benign.                                                  - No significant inflammation, dysplasia or malignancy                  identified.                                                            4. Left colon, biopsy:                                                     - Colonic mucosa with no significant histopathologic                    abnormalities, benign.                                                  - No significant inflammation, dysplasia or malignancy                  identified.                                                            5. Rectal biopsy:                                                          - Colonic mucosa with no significant histopathologic                    abnormalities, benign.                                                  - No significant inflammation, dysplasia or malignancy                  identified.

## 2025-03-10 NOTE — SUBJECTIVE & OBJECTIVE
Interval History:  Patient reports feeling much better.  She is passing flatus and having normal bowel movements.  Tolerating soft diet without abdominal pain.  Denies nausea or vomiting.    Medications:  Continuous Infusions:  Scheduled Meds:   amLODIPine  2.5 mg Oral Daily    methylPREDNISolone injection (PEDS and ADULTS)  60 mg Intravenous Daily    potassium, sodium phosphates  2 packet Oral Q4H     PRN Meds:  Current Facility-Administered Medications:     acetaminophen, 650 mg, Oral, Q6H PRN    dextrose 50%, 12.5 g, Intravenous, PRN    dextrose 50%, 25 g, Intravenous, PRN    glucagon (human recombinant), 1 mg, Intramuscular, PRN    glucose, 16 g, Oral, PRN    glucose, 24 g, Oral, PRN    hydrALAZINE, 5 mg, Intravenous, Q6H PRN    melatonin, 6 mg, Oral, Nightly PRN    naloxone, 0.02 mg, Intravenous, PRN    ondansetron, 4 mg, Intravenous, Q8H PRN    prochlorperazine, 2.5 mg, Intravenous, Q8H PRN    sodium chloride 0.9%, 10 mL, Intravenous, Q12H PRN     Review of patient's allergies indicates:   Allergen Reactions    Naproxen Rash     Objective:     Vital Signs (Most Recent):  Temp: 98.1 °F (36.7 °C) (03/10/25 0837)  Pulse: 104 (03/10/25 0837)  Resp: 18 (03/10/25 0837)  BP: 118/61 (03/10/25 0837)  SpO2: 100 % (03/10/25 0837) Vital Signs (24h Range):  Temp:  [97.7 °F (36.5 °C)-98.3 °F (36.8 °C)] 98.1 °F (36.7 °C)  Pulse:  [] 104  Resp:  [18] 18  SpO2:  [92 %-100 %] 100 %  BP: (118-145)/(61-80) 118/61     Weight: 88.7 kg (195 lb 8.8 oz)  Body mass index is 28.86 kg/m².    Intake/Output - Last 3 Shifts         03/08 0700  03/09 0659 03/09 0700  03/10 0659 03/10 0700  03/11 0659    I.V. (mL/kg) 244.1 (2.8)      Other  120     IV Piggyback 75.7      Total Intake(mL/kg) 319.8 (3.6) 120 (1.4)     Net +319.8 +120                     Physical Exam  Constitutional:       Appearance: She is well-developed.   HENT:      Head: Normocephalic and atraumatic.   Eyes:      Conjunctiva/sclera: Conjunctivae normal.      Pupils:  Pupils are equal, round, and reactive to light.   Neck:      Thyroid: No thyromegaly.   Cardiovascular:      Rate and Rhythm: Normal rate and regular rhythm.   Pulmonary:      Effort: Pulmonary effort is normal. No respiratory distress.   Abdominal:      General: There is no distension.      Palpations: Abdomen is soft. There is no mass.      Tenderness: There is no abdominal tenderness.   Musculoskeletal:         General: No tenderness. Normal range of motion.      Cervical back: Normal range of motion.   Skin:     General: Skin is warm and dry.      Capillary Refill: Capillary refill takes less than 2 seconds.   Neurological:      General: No focal deficit present.      Mental Status: She is alert and oriented to person, place, and time.          Significant Labs:  I have reviewed all pertinent lab results within the past 24 hours.  CBC:   Recent Labs   Lab 03/10/25  0718   WBC 6.31   RBC 4.09   HGB 10.7*   HCT 35.7*   *   MCV 87   MCH 26.2*   MCHC 30.0*     CMP:   Recent Labs   Lab 03/10/25  0718   GLU 95   CALCIUM 8.7   ALBUMIN 2.6*   PROT 5.8*      K 3.5   CO2 22*      BUN 10   CREATININE 0.8   ALKPHOS 67   ALT 10   AST 12   BILITOT 0.3       Significant Diagnostics:  I have reviewed all pertinent imaging results/findings within the past 24 hours.

## 2025-03-10 NOTE — DISCHARGE INSTRUCTIONS
-You were admitted to our hospital for treatment of bowel obstruction concerns secondary to Crohn's disease strictures. Over the course of your hospitalization, our gastroenterology and General surgery team evaluated you.    -Please read the attached information regarding Crohn's disease, small bowel obstruction and go to your nearest ED if any of the alarm/concerning signs develop.    -it is extremely important for you to follow up with Dr. Lopez with Gastroenterology and Dr. Kevin with Colorectal surgery for further evaluation.  We have placed a referral to [facility].  Please give our scheduling line a call at [phone] to schedule an appointment with them if it has not been setup already.    -Your blood pressures were also elevated during your hospitalization.  As you resume your home blood pressure medications, please make sure to frequently monitor your home BP, keep a BP log and take it to your next healthcare provider visit.  Please reach out to a healthcare provider if your blood pressures are  too low or too high.      -Your labs remained stable, but some labs still remained abnormal. As we discussed, it is extremely important for you to followup with a primary care physician within 1 week for repeat lab work to ensure normalization, follow up of pending labs, medication adjustments/refills, routine post-discharge care, and any other evaluations as necessitated.

## 2025-03-10 NOTE — PROGRESS NOTES
[facility]  General Surgery  Progress Note    Subjective:     History of Present Illness:  Claudia Chris is a 64 y.o. female diagnosed w/ crohn's disease in August of 2024 with known small bowel strictures who presented to the ED last night with vomiting x 5 days and worsening abdominal pain.  She states that for the past 5 days she has vomited 2x / day, ad that she has been having diarrhea, and worsening pain. She notes that normally if she sticks to a liquid diet for a few days her pain subsides, but that this time she was on a liquid diet for 3 days without relief.   Upon presentation to the ED she was afebrile, VSS.  Labs were WNL.  CT scan showed moderate segment small bowel wall thickening. Suspected stenosis of the small bowel produces significant pre stenotic dilation and upstream dilation with air-fluid levels concerning for small bowel obstruction. She is still passing flatus and having bowel function.      Regarding her Crohn's disease she had multiple admissions last year for sBOs which resolved without surgical intervention as well as intermittent diarrhea for several years with up to 3-4X per day. Other times, has difficulty evacuating and will sit on the toilet for long periods of time. Has had intermittent blood in stool. Has mild nausea.. CT scan on 7/30/24 showed multiple distended SB loops, two focal areas of narrowing in small bowel, PillCam in small bowel (which she swallowed in 2017).  She was seen by GI at [facility] and there was concern for IBD.  She was referred to [facility] for balloon enteroscopy which she had on 8/26/24 with removal of PillCam from proximal jejunum and noted small bowel changes concerning for Crohn's disease. She was started on a Prednisone taper which improved her symptoms and then eventually on Humira.  She was seen by Dr. Hair Kevin with colorectal surgery at [facility] on 12/11/2024 due to the imaging with evidence of chronic stricturing.  He obtained  MRI enterography which showed sequelae of acute on chronic Crohn's disease with several segments of active inflammation in the proximal and distal small bowel with associated mild stricturing. , the plan at that time was medical optimization with the knowledge that she would likely require surgery for some of this stricturing in the future but with the goal of minimizing the amount of bowel resected.  She initially had some issues with taking the Humira but reports that she has been taking it.      Post-Op Info:  * No surgery found *         Interval History:  Patient reports feeling much better.  She is passing flatus and having normal bowel movements.  Tolerating soft diet without abdominal pain.  Denies nausea or vomiting.    Medications:  Continuous Infusions:  Scheduled Meds:   amLODIPine  2.5 mg Oral Daily    methylPREDNISolone injection (PEDS and ADULTS)  60 mg Intravenous Daily    potassium, sodium phosphates  2 packet Oral Q4H     PRN Meds:  Current Facility-Administered Medications:     acetaminophen, 650 mg, Oral, Q6H PRN    dextrose 50%, 12.5 g, Intravenous, PRN    dextrose 50%, 25 g, Intravenous, PRN    glucagon (human recombinant), 1 mg, Intramuscular, PRN    glucose, 16 g, Oral, PRN    glucose, 24 g, Oral, PRN    hydrALAZINE, 5 mg, Intravenous, Q6H PRN    melatonin, 6 mg, Oral, Nightly PRN    naloxone, 0.02 mg, Intravenous, PRN    ondansetron, 4 mg, Intravenous, Q8H PRN    prochlorperazine, 2.5 mg, Intravenous, Q8H PRN    sodium chloride 0.9%, 10 mL, Intravenous, Q12H PRN     Review of patient's allergies indicates:   Allergen Reactions    Naproxen Rash     Objective:     Vital Signs (Most Recent):  Temp: 98.1 °F (36.7 °C) (03/10/25 0837)  Pulse: 104 (03/10/25 0837)  Resp: 18 (03/10/25 0837)  BP: 118/61 (03/10/25 0837)  SpO2: 100 % (03/10/25 0837) Vital Signs (24h Range):  Temp:  [97.7 °F (36.5 °C)-98.3 °F (36.8 °C)] 98.1 °F (36.7 °C)  Pulse:  [] 104  Resp:  [18] 18  SpO2:  [92 %-100 %] 100  %  BP: (118-145)/(61-80) 118/61     Weight: 88.7 kg (195 lb 8.8 oz)  Body mass index is 28.86 kg/m².    Intake/Output - Last 3 Shifts         03/08 0700  03/09 0659 03/09 0700  03/10 0659 03/10 0700 03/11 0659    I.V. (mL/kg) 244.1 (2.8)      Other  120     IV Piggyback 75.7      Total Intake(mL/kg) 319.8 (3.6) 120 (1.4)     Net +319.8 +120                     Physical Exam  Constitutional:       Appearance: She is well-developed.   HENT:      Head: Normocephalic and atraumatic.   Eyes:      Conjunctiva/sclera: Conjunctivae normal.      Pupils: Pupils are equal, round, and reactive to light.   Neck:      Thyroid: No thyromegaly.   Cardiovascular:      Rate and Rhythm: Normal rate and regular rhythm.   Pulmonary:      Effort: Pulmonary effort is normal. No respiratory distress.   Abdominal:      General: There is no distension.      Palpations: Abdomen is soft. There is no mass.      Tenderness: There is no abdominal tenderness.   Musculoskeletal:         General: No tenderness. Normal range of motion.      Cervical back: Normal range of motion.   Skin:     General: Skin is warm and dry.      Capillary Refill: Capillary refill takes less than 2 seconds.   Neurological:      General: No focal deficit present.      Mental Status: She is alert and oriented to person, place, and time.          Significant Labs:  I have reviewed all pertinent lab results within the past 24 hours.  CBC:   Recent Labs   Lab 03/10/25  0718   WBC 6.31   RBC 4.09   HGB 10.7*   HCT 35.7*   *   MCV 87   MCH 26.2*   MCHC 30.0*     CMP:   Recent Labs   Lab 03/10/25  0718   GLU 95   CALCIUM 8.7   ALBUMIN 2.6*   PROT 5.8*      K 3.5   CO2 22*      BUN 10   CREATININE 0.8   ALKPHOS 67   ALT 10   AST 12   BILITOT 0.3       Significant Diagnostics:  I have reviewed all pertinent imaging results/findings within the past 24 hours.  Assessment/Plan:     * Crohn's disease of small intestine with intestinal obstruction  Pt w/ known  Crohn's disease and chronic stricturing.  Reports abdominal pain, nausea and vomiting improved    -- no acute surgical intervention   -- recommend continuing biologics and steroid taper  -- advanced to low residue diet  -- appreciate GI recs  -- patient plans to follow up with Dr. Lopez as well as Dr. Kevin.  I discussed with her extensively that she would likely need surgery in the future possibly prior to switching biologics.  She does have severe stricturing disease  -- okay for discharge from surgery standpoint    Small bowel stricture  2/2 Crohn's   -- Management as per primary team and GI     Primary osteoarthritis of left knee  -- Management as per primary team     GERD (gastroesophageal reflux disease)  -- Management as per primary team         Mabel Shaver MD  General Surgery  [facility]

## 2025-03-12 ENCOUNTER — RESULTS FOLLOW-UP (OUTPATIENT)
Dept: GASTROENTEROLOGY | Facility: HOSPITAL | Age: 65
End: 2025-03-12

## 2025-03-12 LAB
CALPROTECTIN INTERPRETATION (OHS): ABNORMAL
CALPROTECTIN: >800

## 2025-03-12 NOTE — PROGRESS NOTES
One of your inflammatory markers was rechecked after you started steroids and there was some improvement. Please follow up with Dr. Kevin and Dr. Lopez at [facility] regarding your Crohn's. Continue to take the steroid taper as instructed and limit your diet to a puree diet as recommended due to the strictures in your small intestine. I have reached out to Dr. Lopez and you should be hearing from her office for GI follow up.    Thank you for allowing me to be part of your care.    Sincerely,    Fabby Kamara MD

## 2025-03-14 NOTE — DISCHARGE SUMMARY
"[facility]  Hospital Medicine  Discharge Summary      Patient Name: Claudia Chris  MRN: [id number]  RICK: [id number]  Patient Class: IP- Inpatient  Admission Date: 3/8/2025  Hospital Length of Stay: 2 days  Discharge Date and Time: 3/10/2025 12:24 PM  Attending Physician: Obdulia att. providers found   Discharging Provider: Isai Ascencio DO  Primary Care Provider: Mavis Craig FNP    Primary Care Team: [facility] MEDICINE [facility]    HPI:   Claudia Chris is a 64 year old female with history of GERD, HTN, and recently diagnosed extensive Crohn's Disease who presents to ED for further evaluation of abdominal pain. She is followed by  Dr. Kevin at [facility] for Crohn's disease who placed her on Humira every 2 weeks about two months ago. She has been compliant with injections and denies symptoms worsening symptoms until 5 days ago. She reports increased abdominal fullness and sharpness with associated nausea, diarrhea, and vomiting. She denies fever, chills, chest pains, or palpitations.     In ED, labs were significant for thrombocytosis. Per ED Staff "CT scan multifocal small bowel wall thickening with strictures causing SBO". General Surgery and GI were both consulted. ESR and CRP ordered. She will be placed on bowel rest and admitted to  for further management.     * No surgery found *      Hospital Course:   Claudia Chris is a 64 year old female who was admitted for Crohn's disease exacerbation. She presented with N/V/D and abdominal pain and was noted to have findings concerning for small bowel obstruction related to inflammatory enteritis on imaging. GI and General Surgery are assisting with care. She received scheduled IV corticosteroids to reduce inflammation. Inflammatory markers elevated. She was placed on bowel rest and gentle hydration overnight.  During hospital course, patient reported resolution of symptoms with passing of flatus and multiple normal bowel movements.  She was able to to be advanced " in her diet and was able to tolerate a diet without any abdominal symptoms reported.  Discussed with Gastroenterology, recommendations for outpatient taper of prednisone 40mg daily x 10 days then 30mg daily x 10 days then 20mg daily x 10 days then 10mg daily x 10 days then 5mg daily x 10 days.     Discharge plan of care was discussed at length with patient including patient's need for close outpatient follow-up. Instructed on close outpatient follow up with Gastroenterology and Colorectal surgery for further evaluation. Instructed on PCP follow up within 1 week with repeat lab work to ensure normalization, follow up of pending labs, or any further evaluation as necessitated. All questions and concerns were answered. Return precautions provided.  Patient instructed to return to the hospital or seek medical care if baseline status should suddenly change, return of symptoms occurs, or for any new or concerning symptoms that arise.  Patient was in agreement with plan of care going forward. Patient continued to remain afebrile, hemodynamically stable, able to tolerate diet. Patient seen and examined on the day of discharge. Patient deemed stable for discharge.      Goals of Care Treatment Preferences:  Code Status: Full Code         Consults:   Consults (From admission, onward)          Status Ordering Provider     Inpatient consult to Gastroenterology  Once        Provider:  Fabby Kamara MD    Completed CYRIL MONROY     Inpatient consult to General Surgery  Once        Provider:  Juli Dunne MD    Completed CYRIL MONROY            Final Active Diagnoses:    Diagnosis Date Noted POA    PRINCIPAL PROBLEM:  Crohn's disease of small intestine with intestinal obstruction [K50.012] 05/11/2024 Yes    Primary osteoarthritis of left knee [M17.12] 03/08/2025 Yes    Small bowel stricture [K56.699] 03/08/2025 Yes    GERD (gastroesophageal reflux disease) [K21.9] 05/11/2024 Yes      Problems Resolved During  this Admission:       Discharged Condition: stable    Disposition: Home or Self Care    Follow Up:   Follow-up Information       [facility]. Schedule an appointment as soon as possible for a visit.    Why: Please make sure to have close outpatient follow up with your gastroenterologist for further evaluation.  Contact information:  [address]   [address]   [place] [id number] [phone] (Work)   [phone] (Fax)             Hair Kevin MD. Schedule an appointment as soon as possible for a visit.    Specialty: Colon and Rectal Surgery  Why: Please make sure to have close outpatient follow up with your colorectal surgeon for further evaluation.  Contact information:  [address], [address]  [facility] [address] [phone]               Mavis Craig FNP. Schedule an appointment as soon as possible for a visit in 1 week(s).    Specialty: Family Medicine  Why: Your labs remained stable, but some labs still remained abnormal. As we discussed, it is extremely important for you to followup with a primary care physician within 1 week for repeat lab work to ensure normalization, follow up of pending labs, medication adjustments/refills, routine post-discharge care, and any other evaluations as necessitated., Post hospital followup  Contact information:  [address]  [facility] [address]  [facility] [address] [phone]                           Patient Instructions:      Ambulatory referral/consult to Gastroenterology   Standing Status: Future   Referral Priority: Routine Referral Type: Consultation   Referral Reason: Specialty Services Required   Requested Specialty: Gastroenterology   Number of Visits Requested: 1     Notify your health care provider if you experience any of the following:  temperature >100.4     Notify your health care provider if you experience any of the following:  persistent nausea and vomiting or diarrhea     Notify your health care provider if you  "experience any of the following:  severe uncontrolled pain     Notify your health care provider if you experience any of the following:  redness, tenderness, or signs of infection (pain, swelling, redness, odor or green/yellow discharge around incision site)     Notify your health care provider if you experience any of the following:  difficulty breathing or increased cough     Notify your health care provider if you experience any of the following:  severe persistent headache     Notify your health care provider if you experience any of the following:  worsening rash     Notify your health care provider if you experience any of the following:  persistent dizziness, light-headedness, or visual disturbances     Notify your health care provider if you experience any of the following:  increased confusion or weakness     Notify your health care provider if you experience any of the following:   Order Comments: CROHN'S DISEASE ALARM SIGNS  "Please return to the ED emergently if you experience any of the following: severe or worsening abdominal pain that does not improve with usual medications, significant changes in bowel movements such as the onset of severe diarrhea or blood in the stool, persistent vomiting that is not relieved with antiemetics, high fever, or signs of dehydration such as dry mouth, dizziness, or reduced urination, as these may indicate a complication such as bowel perforation, abscess, or a severe flare of Crohn's disease requiring immediate intervention."    SMALL BOWEL OBSTRUCTION ALARM SIGNS  "Please return to the ED emergently if you experience any of the following: increasing or persistent abdominal pain that becomes severe or sharp, inability to pass gas or stool, persistent vomiting that is difficult to control, abdominal distension that continues to worsen, or signs of dehydration including dry mouth, dizziness, or decreased urine output, as these may be indicative of a worsening " "obstruction, bowel ischemia, or perforation that requires immediate treatment."    ABDOMINAL PAIN ALARM SIGNS  "Please return to the ED emergently if you experience any of the following: sudden or severe worsening of abdominal pain, persistent pain that does not improve with usual treatments, fever, nausea or vomiting that is difficult to control, changes in bowel movements including the onset of bloody stools or black, tarry stools, or signs of dehydration such as dry mouth, dizziness, or decreased urine output, as these symptoms may signal a serious underlying condition such as perforation, infection, or bowel ischemia requiring urgent evaluation."       Significant Diagnostic Studies:   Significant Labs:   Recent Labs   Lab 03/08/25  1555 03/09/25  0918 03/10/25  0718    138 139   K 3.5 3.9 3.5    106 108   CO2 20* 22* 22*   BUN 13 13 10   CREATININE 0.8 0.8 0.8   GLU 97 78 95   ANIONGAP 13 10 9     Recent Labs   Lab 03/08/25  1555 03/09/25  0918 03/10/25  0718   MG 1.9 1.7 2.1   PHOS 3.0 3.2 2.5*     Recent Labs   Lab 03/08/25  1555 03/09/25  0918 03/10/25  0718   AST 14 11 12   ALT 11 10 10   ALKPHOS 74 65 67   BILITOT 0.3 0.4 0.3   ALBUMIN 2.8* 2.6* 2.6*    Recent Labs   Lab 03/08/25  1508 03/09/25  0918 03/10/25  0718   WBC 6.20 4.96 6.31   HGB 12.6 10.4* 10.7*   HCT 40.2 34.8* 35.7*   * 522* 596*   GRAN 55.3  3.4 52.4  2.6 63.0  4.0            Significant Imaging:   X-Ray Abdomen AP 1 View   Final Result    See above      Finalized on: 3/9/2025 3:03 PM By:  Naun Palacio MD   [facility]# [id number]      2025-03-09 15:05:[id number]     [facility]      CT Abdomen Pelvis  Without Contrast   Final Result   Abnormal      Findings concerning for small bowel obstruction likely as the sequela of underlying acute or remote infectious or inflammatory enteritis.  Crohn's disease would be a consideration.  Consider gastroenterology consultation.      This report was flagged in Epic as abnormal.  Findings related to " the clinical team via electronic message system with acknowledgement of receipt.      All CT scans at this facility are performed  using dose modulation techniques as appropriate to performed exam including the following:  automated exposure control; adjustment of mA and/or kV according to the patients size (this includes techniques or standardized protocols for targeted exams where dose is matched to indication/reason for exam: i.e. extremities or head);  iterative reconstruction technique.         Electronically signed by: Chepe Villarreal   Date:    03/09/2025   Time:    08:45      X-Ray Abdomen AP 1 View (KUB)   Final Result    Unremarkable exam.      Finalized on: 3/8/2025 3:34 PM By:  Tamir Liz MD   [facility]# [id number]      2025-03-08 15:36:47.335     [facility]      Radiology   Final Result              Pending Diagnostic Studies:       None           Medications:  Reconciled Home Medications:      Medication List        START taking these medications      predniSONE 10 MG tablet  Commonly known as: DELTASONE  Take 4 tablets (40 mg total) by mouth once daily for 10 days, THEN 3 tablets (30 mg total) once daily for 10 days, THEN 2 tablets (20 mg total) once daily for 10 days, THEN 1 tablet (10 mg total) once daily for 10 days, THEN 0.5 tablets (5 mg total) once daily for 10 days.  Start taking on: March 11, 2025            CONTINUE taking these medications      amLODIPine 2.5 MG tablet  Commonly known as: NORVASC  Take 2.5 mg by mouth.     ergocalciferol 50,000 unit Cap  Commonly known as: ERGOCALCIFEROL  Take 50,000 Units by mouth every 7 days.     furosemide 20 MG tablet  Commonly known as: LASIX  Take 20 mg by mouth once daily.     HUMIRA(CF) PEN 40 mg/0.4 mL Pnkt  Generic drug: adalimumab  Inject 40 mg into the skin every 14 (fourteen) days.     ondansetron 4 MG tablet  Commonly known as: ZOFRAN  Take 8 mg by mouth 2 (two) times daily.     pantoprazole 40 MG tablet  Commonly known as: PROTONIX  Take 1 tablet (40  mg total) by mouth once daily.              Indwelling Lines/Drains at time of discharge:   Lines/Drains/Airways       None                   Time spent on the discharge of patient: 60 minutes         Isai Ascencio DO  Department of Hospital Medicine  [facility]    Voice recognition software was used in the creation of this note/communication and any sound-alike/typographical errors which may have occurred despite initial review prior to signing should be taken in context when interpreting.  If such errors prevent a clear understanding of the note/communication, please contact the provider/office for clarification.

## 2025-07-28 ENCOUNTER — HOSPITAL ENCOUNTER (INPATIENT)
Facility: HOSPITAL | Age: 65
LOS: 1 days | Discharge: HOME | DRG: 386 | End: 2025-07-29
Admitting: SPECIALIST
Payer: MEDICAID

## 2025-07-28 DIAGNOSIS — R82.4 KETONURIA: ICD-10-CM

## 2025-07-28 DIAGNOSIS — R10.10 UPPER ABDOMINAL PAIN: ICD-10-CM

## 2025-07-28 DIAGNOSIS — K56.609 SMALL BOWEL OBSTRUCTION: Primary | ICD-10-CM

## 2025-07-28 DIAGNOSIS — E86.0 DEHYDRATION: ICD-10-CM

## 2025-07-28 DIAGNOSIS — R11.2 NAUSEA AND VOMITING, UNSPECIFIED VOMITING TYPE: ICD-10-CM

## 2025-07-28 LAB
ABSOLUTE EOSINOPHIL (OHS): 0.07 K/UL
ABSOLUTE MONOCYTE (OHS): 1.06 K/UL (ref 0.3–1)
ABSOLUTE NEUTROPHIL COUNT (OHS): 3.06 K/UL (ref 1.8–7.7)
ALBUMIN SERPL BCP-MCNC: 3.2 G/DL (ref 3.5–5.2)
ALP SERPL-CCNC: 69 UNIT/L (ref 40–150)
ALT SERPL W/O P-5'-P-CCNC: <8 UNIT/L (ref 10–44)
ANION GAP (OHS): 12 MMOL/L (ref 8–16)
AST SERPL-CCNC: 17 UNIT/L (ref 11–45)
BACTERIA #/AREA URNS AUTO: ABNORMAL /HPF
BASOPHILS # BLD AUTO: 0.08 K/UL
BASOPHILS NFR BLD AUTO: 1.5 %
BILIRUB SERPL-MCNC: 0.4 MG/DL (ref 0.1–1)
BILIRUB UR QL STRIP.AUTO: ABNORMAL
BUN SERPL-MCNC: 15 MG/DL (ref 8–23)
CALCIUM SERPL-MCNC: 9.5 MG/DL (ref 8.7–10.5)
CHLORIDE SERPL-SCNC: 101 MMOL/L (ref 95–110)
CLARITY UR: ABNORMAL
CO2 SERPL-SCNC: 23 MMOL/L (ref 23–29)
COLOR UR AUTO: YELLOW
CREAT SERPL-MCNC: 1 MG/DL (ref 0.5–1.4)
CRP SERPL-MCNC: 162.8 MG/L
ERYTHROCYTE [DISTWIDTH] IN BLOOD BY AUTOMATED COUNT: 15.4 % (ref 11.5–14.5)
ERYTHROCYTE [SEDIMENTATION RATE] IN BLOOD: 70 MM/HR
GFR SERPLBLD CREATININE-BSD FMLA CKD-EPI: >60 ML/MIN/1.73/M2
GLUCOSE SERPL-MCNC: 104 MG/DL (ref 70–110)
GLUCOSE UR QL STRIP: NEGATIVE
HCT VFR BLD AUTO: 44.3 % (ref 37–48.5)
HGB BLD-MCNC: 13.5 GM/DL (ref 12–16)
HGB UR QL STRIP: NEGATIVE
HYALINE CASTS UR QL AUTO: 3 /LPF (ref 0–1)
IMM GRANULOCYTES # BLD AUTO: 0.03 K/UL (ref 0–0.04)
IMM GRANULOCYTES NFR BLD AUTO: 0.6 % (ref 0–0.5)
KETONES UR QL STRIP: ABNORMAL
LEUKOCYTE ESTERASE UR QL STRIP: NEGATIVE
LIPASE SERPL-CCNC: 13 U/L (ref 4–60)
LYMPHOCYTES # BLD AUTO: 1.08 K/UL (ref 1–4.8)
MCH RBC QN AUTO: 25 PG (ref 27–31)
MCHC RBC AUTO-ENTMCNC: 30.5 G/DL (ref 32–36)
MCV RBC AUTO: 82 FL (ref 82–98)
MICROSCOPIC COMMENT: ABNORMAL
NITRITE UR QL STRIP: NEGATIVE
NUCLEATED RBC (/100WBC) (OHS): 0 /100 WBC
PH UR STRIP: 6 [PH]
PLATELET # BLD AUTO: 638 K/UL (ref 150–450)
PMV BLD AUTO: 8.5 FL (ref 9.2–12.9)
POTASSIUM SERPL-SCNC: 4.1 MMOL/L (ref 3.5–5.1)
PROT SERPL-MCNC: 7.8 GM/DL (ref 6–8.4)
PROT UR QL STRIP: ABNORMAL
RBC # BLD AUTO: 5.39 M/UL (ref 4–5.4)
RBC #/AREA URNS AUTO: 1 /HPF (ref 0–4)
RELATIVE EOSINOPHIL (OHS): 1.3 %
RELATIVE LYMPHOCYTE (OHS): 20.1 % (ref 18–48)
RELATIVE MONOCYTE (OHS): 19.7 % (ref 4–15)
RELATIVE NEUTROPHIL (OHS): 56.8 % (ref 38–73)
SODIUM SERPL-SCNC: 136 MMOL/L (ref 136–145)
SP GR UR STRIP: 1.03
SQUAMOUS #/AREA URNS AUTO: 9 /HPF
UROBILINOGEN UR STRIP-ACNC: ABNORMAL EU/DL
WBC # BLD AUTO: 5.38 K/UL (ref 3.9–12.7)
WBC #/AREA URNS AUTO: 3 /HPF (ref 0–5)

## 2025-07-28 PROCEDURE — 99223 1ST HOSP IP/OBS HIGH 75: CPT | Mod: ,,, | Performed by: STUDENT IN AN ORGANIZED HEALTH CARE EDUCATION/TRAINING PROGRAM

## 2025-07-28 PROCEDURE — 63600175 PHARM REV CODE 636 W HCPCS

## 2025-07-28 PROCEDURE — 25000003 PHARM REV CODE 250: Performed by: NURSE PRACTITIONER

## 2025-07-28 PROCEDURE — 96375 TX/PRO/DX INJ NEW DRUG ADDON: CPT

## 2025-07-28 PROCEDURE — 11000001 HC ACUTE MED/SURG PRIVATE ROOM

## 2025-07-28 PROCEDURE — 25500020 PHARM REV CODE 255

## 2025-07-28 PROCEDURE — 85652 RBC SED RATE AUTOMATED: CPT | Performed by: NURSE PRACTITIONER

## 2025-07-28 PROCEDURE — 99285 EMERGENCY DEPT VISIT HI MDM: CPT | Mod: 25

## 2025-07-28 PROCEDURE — 36415 COLL VENOUS BLD VENIPUNCTURE: CPT | Performed by: NURSE PRACTITIONER

## 2025-07-28 PROCEDURE — 85025 COMPLETE CBC W/AUTO DIFF WBC: CPT

## 2025-07-28 PROCEDURE — 25000003 PHARM REV CODE 250

## 2025-07-28 PROCEDURE — 96361 HYDRATE IV INFUSION ADD-ON: CPT

## 2025-07-28 PROCEDURE — 21400001 HC TELEMETRY ROOM

## 2025-07-28 PROCEDURE — 96376 TX/PRO/DX INJ SAME DRUG ADON: CPT

## 2025-07-28 PROCEDURE — 86140 C-REACTIVE PROTEIN: CPT

## 2025-07-28 PROCEDURE — 63600175 PHARM REV CODE 636 W HCPCS: Performed by: NURSE PRACTITIONER

## 2025-07-28 PROCEDURE — 96374 THER/PROPH/DIAG INJ IV PUSH: CPT

## 2025-07-28 PROCEDURE — 83690 ASSAY OF LIPASE: CPT

## 2025-07-28 PROCEDURE — 99223 1ST HOSP IP/OBS HIGH 75: CPT | Mod: ,,, | Performed by: INTERNAL MEDICINE

## 2025-07-28 PROCEDURE — 80053 COMPREHEN METABOLIC PANEL: CPT

## 2025-07-28 PROCEDURE — 81001 URINALYSIS AUTO W/SCOPE: CPT

## 2025-07-28 RX ORDER — ONDANSETRON HYDROCHLORIDE 2 MG/ML
4 INJECTION, SOLUTION INTRAVENOUS
Status: COMPLETED | OUTPATIENT
Start: 2025-07-28 | End: 2025-07-28

## 2025-07-28 RX ORDER — HYDROCODONE BITARTRATE AND ACETAMINOPHEN 10; 325 MG/1; MG/1
1 TABLET ORAL EVERY 6 HOURS PRN
Refills: 0 | Status: DISCONTINUED | OUTPATIENT
Start: 2025-07-28 | End: 2025-07-29 | Stop reason: HOSPADM

## 2025-07-28 RX ORDER — MORPHINE SULFATE 4 MG/ML
4 INJECTION, SOLUTION INTRAMUSCULAR; INTRAVENOUS EVERY 6 HOURS PRN
Refills: 0 | Status: DISCONTINUED | OUTPATIENT
Start: 2025-07-28 | End: 2025-07-29 | Stop reason: HOSPADM

## 2025-07-28 RX ORDER — ONDANSETRON HYDROCHLORIDE 2 MG/ML
4 INJECTION, SOLUTION INTRAVENOUS EVERY 8 HOURS PRN
Status: DISCONTINUED | OUTPATIENT
Start: 2025-07-28 | End: 2025-07-29 | Stop reason: HOSPADM

## 2025-07-28 RX ORDER — SODIUM CHLORIDE 0.9 % (FLUSH) 0.9 %
10 SYRINGE (ML) INJECTION
Status: DISCONTINUED | OUTPATIENT
Start: 2025-07-28 | End: 2025-07-29 | Stop reason: HOSPADM

## 2025-07-28 RX ORDER — PANTOPRAZOLE SODIUM 40 MG/1
40 TABLET, DELAYED RELEASE ORAL DAILY
Status: DISCONTINUED | OUTPATIENT
Start: 2025-07-28 | End: 2025-07-29 | Stop reason: HOSPADM

## 2025-07-28 RX ORDER — SODIUM CHLORIDE 9 MG/ML
INJECTION, SOLUTION INTRAVENOUS CONTINUOUS
Status: DISCONTINUED | OUTPATIENT
Start: 2025-07-28 | End: 2025-07-29 | Stop reason: HOSPADM

## 2025-07-28 RX ORDER — MORPHINE SULFATE 4 MG/ML
4 INJECTION, SOLUTION INTRAMUSCULAR; INTRAVENOUS
Refills: 0 | Status: COMPLETED | OUTPATIENT
Start: 2025-07-28 | End: 2025-07-28

## 2025-07-28 RX ORDER — AMLODIPINE BESYLATE 5 MG/1
5 TABLET ORAL DAILY
Status: DISCONTINUED | OUTPATIENT
Start: 2025-07-28 | End: 2025-07-29 | Stop reason: HOSPADM

## 2025-07-28 RX ORDER — ENOXAPARIN SODIUM 100 MG/ML
40 INJECTION SUBCUTANEOUS EVERY 24 HOURS
Status: DISCONTINUED | OUTPATIENT
Start: 2025-07-28 | End: 2025-07-29 | Stop reason: HOSPADM

## 2025-07-28 RX ADMIN — HYDROCODONE BITARTRATE AND ACETAMINOPHEN 1 TABLET: 10; 325 TABLET ORAL at 11:07

## 2025-07-28 RX ADMIN — SODIUM CHLORIDE: 9 INJECTION, SOLUTION INTRAVENOUS at 08:07

## 2025-07-28 RX ADMIN — MORPHINE SULFATE 4 MG: 4 INJECTION INTRAVENOUS at 07:07

## 2025-07-28 RX ADMIN — ONDANSETRON 4 MG: 2 INJECTION INTRAMUSCULAR; INTRAVENOUS at 11:07

## 2025-07-28 RX ADMIN — MORPHINE SULFATE 4 MG: 4 INJECTION INTRAVENOUS at 04:07

## 2025-07-28 RX ADMIN — ONDANSETRON 4 MG: 2 INJECTION INTRAMUSCULAR; INTRAVENOUS at 03:07

## 2025-07-28 RX ADMIN — MORPHINE SULFATE 4 MG: 4 INJECTION INTRAVENOUS at 01:07

## 2025-07-28 RX ADMIN — METHYLPREDNISOLONE SODIUM SUCCINATE 40 MG: 40 INJECTION, POWDER, FOR SOLUTION INTRAMUSCULAR; INTRAVENOUS at 01:07

## 2025-07-28 RX ADMIN — PANTOPRAZOLE SODIUM 40 MG: 40 TABLET, DELAYED RELEASE ORAL at 11:07

## 2025-07-28 RX ADMIN — IOHEXOL 100 ML: 350 INJECTION, SOLUTION INTRAVENOUS at 05:07

## 2025-07-28 RX ADMIN — SODIUM CHLORIDE: 9 INJECTION, SOLUTION INTRAVENOUS at 11:07

## 2025-07-28 RX ADMIN — SODIUM CHLORIDE 1000 ML: 9 INJECTION, SOLUTION INTRAVENOUS at 04:07

## 2025-07-28 RX ADMIN — ENOXAPARIN SODIUM 40 MG: 40 INJECTION SUBCUTANEOUS at 05:07

## 2025-07-28 RX ADMIN — AMLODIPINE BESYLATE 5 MG: 5 TABLET ORAL at 11:07

## 2025-07-28 NOTE — ASSESSMENT & PLAN NOTE
Patient's blood pressure range in the last 24 hours was: BP  Min: 126/58  Max: 149/73.The patient's inpatient anti-hypertensive regimen is listed below:  Current Antihypertensives  amLODIPine tablet 5 mg, Daily, Oral    Plan  - BP is controlled, no changes needed to their regimen
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
## 2025-07-28 NOTE — FIRST PROVIDER EVALUATION
"Medical screening examination initiated.  I have conducted a focused provider triage encounter, findings are as follows:    Brief history of present illness:  generalized abdominal pain, hx SBO. Hx crohns dz.     Vitals:    07/28/25 0330   BP: 134/85   BP Location: Right arm   Pulse: (!) 122   Resp: 18   Temp: 98.7 °F (37.1 °C)   TempSrc: Oral   SpO2: 97%   Weight: 93.6 kg (206 lb 6.4 oz)   Height: 5' 9" (1.753 m)       Pertinent physical exam:  abdominal pain, n/v, tachycardia, crohns dz    Brief workup plan:  workup, medications, further eval    Preliminary workup initiated; this workup will be continued and followed by the physician or advanced practice provider that is assigned to the patient when roomed.  "
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
## 2025-07-28 NOTE — ASSESSMENT & PLAN NOTE
Chronic and 2/2 untreated Crohn's  Follows with Dr. Kevin of [facility]  Last MRE showed several dilated loops of small bowel with several focal areas of circumferential wall thickening with enhancement and restricted diffusion with luminal narrowing. 7.7 cm segment of wall thickening and enhancement with luminal narrowing in jejunum and mild upstream dilatation suggesting stricture. Additional loop of small bowel in the lower abdomen measuring 6 cm with luminal narrowing and upstream bowel dilatation with associated wall thickening, enhancement and mild restricted diffusion.   Dr. Kevin felt patient would eventually need surgical intervention and recommended follow up to discuss surgical options if she became symptomatic.

## 2025-07-28 NOTE — HPI
Claudia Chris is a 65 year old female who  has a past medical history of GERD (gastroesophageal reflux disease), Crohn's, Hypertension, Personal history of colonic polyps, and Refusal of blood transfusions as patient is Adventism who presented to the Emergency Department for evaluation of abdominal pain. Hx of Crohn's. Onset 5  days ago. Associated symptoms include: N/V, generalized weakness and fatigue. Pt states she thought it was her Crohn's and that why she came to the ED. She is currently not on treatment for her Crohn's. She was on Humira but it did not help. Her insurance would not cover Skyrizi. Her GI provider is trying to get infusions covered at home. She is followed by Dr. Lopez at [facility]. She reports having BMs and flatus. Last BM was last night, soft formed. No diarrhea.    In ED, WBC count 5.38, Hgb/Hct 13.5/44.3, Plt 638. CT abdomen/pelvis w/ IV contrast showed redemonstration small bowel obstruction again likely related to patient's underlying Crohn's disease. ED discussed case with GI and General surgery. GI recommended ESR and CRP. Following a comprehensive evaluation of the patient's clinical presentation, vital signs, laboratory results, and risk factors, myself with the attending made  the active decision to admit the patient for further monitoring, diagnostic work-up, and initiation of appropriate treatment, given the potential for clinical deterioration if managed in an outpatient setting.
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
## 2025-07-28 NOTE — HPI
65 y.o AA female with stricturing Crohn's disease known to GI service fro repeat admissions. She is followed by Dr. Ritu Lopez at [facility] and Dr. Kevin, LIOR at [facility]. She was formally diagnosed of Crohn's disease in July 2024 after undergoing a device assisted enteroscopy with foreign body removal. She was found to have a Pill Cam in the proximal jejunum that was removed with Forte net. Once removed there was an area of severe, diffuse inflammation, characterized by congestion (edema), erythema, friability, granularity, mucus, confluent ulcerations and deep ulcerations with severe stenosis. Path confirmed chronic enteritis with severe activity, ulceration and granulation tissue. No inclusion bodies or granulomas were seen. Patient endorsed a history of intermittent small bowel obstructions since 2017 with previous investigations in [place], [place]. Patient reported the PillCam that was removed in 2024 was actually placed back in 2017 while residing in [place]. Those outside records are not available for review.     She was last hospitalized in March 2025 with obstructive symptoms despite being on Humira. She was managed conservatively with IV steroids and followed up with Dr. Lopez who recommended Skyrizi. Her insurance denied this biologic. Since this time, patient has been awaiting approval of Remicade infusion. She is not on any therapy at this time. Ms. Chris presented to the ED with complaints of nausea and vomiting. Imaging confirmed 2 distinct areas of gradual tapering and thickened small bowel wall. Mild associated inflammatory stranding was also seen. Patient is seen in her room. She appears comfortable. Last episode of nausea and emesis was at 1am. There is excessive belching. No hematemesis. Passing flatus. Labs on admission show elevated , ESR 70, albumin 3.2.     Last colonoscopy 09/2024. Prep reported with semi-liquid stool noted throughout colon. Adequate visualization accomplished  with lavage and suction. Biopsies of the terminal ileum, right colon, left colon and rectum showed no significant histopathologic abnormalities and benign. A transverse colon polyp was not a true adenoma. It was polypoid colonic mucosa. Repeat colonoscopy was recommended in a year due to suboptimal prep.     Last MRE 12/30/24 showed several dilated loops of small bowel with several focal areas of circumferential wall thickening with enhancement and restricted diffusion with luminal narrowing. In the jejunum there is a 7.7 cm segment of wall thickening and enhancement with luminal narrowing and mild upstream dilatation suggesting stricture. Additional loop of small bowel in the lower abdomen measuring 6 cm with luminal narrowing and upstream bowel dilatation with associated wall thickening, enhancement and mild restricted diffusion. Dr. Kevin felt this patient would eventually need surgical intervention and recommended follow up to discuss surgical options if she became symptomatic. Patient has not followed up with him since her last hospitalization.

## 2025-07-28 NOTE — ASSESSMENT & PLAN NOTE
-CT abdomen/pelvis w/ IV contrast showed redemonstration small bowel obstruction again likely related to patient's underlying Crohn's disease   -Pt continues to have BMs and flatus. Last BM last night was soft formed. She denies diarrhea  -GI and General surgery have been consulted  -GI recommended ESR and CRP which are pending  -General surgery recommending steriods and F/U with her primary GI provider. GI aware of recs  -Keep NPO  -IVF  -Antiemetics/Analgesics PRN  -Will advance diet to clears as tolerated

## 2025-07-28 NOTE — ASSESSMENT & PLAN NOTE
Due to untreated Crohn's  Follows with Dr. Chaney   Failed Minnie 03/2025.   Insurance denied Skyrizi  Elevated CRP and ESR  Solumedrol 40mg IV BID  Tolerating clears

## 2025-07-29 VITALS
TEMPERATURE: 99 F | SYSTOLIC BLOOD PRESSURE: 158 MMHG | HEART RATE: 90 BPM | DIASTOLIC BLOOD PRESSURE: 84 MMHG | BODY MASS INDEX: 30.57 KG/M2 | HEIGHT: 69 IN | OXYGEN SATURATION: 99 % | WEIGHT: 206.38 LBS | RESPIRATION RATE: 18 BRPM

## 2025-07-29 LAB — HOLD SPECIMEN: NORMAL

## 2025-07-29 PROCEDURE — 99232 SBSQ HOSP IP/OBS MODERATE 35: CPT | Mod: ,,, | Performed by: STUDENT IN AN ORGANIZED HEALTH CARE EDUCATION/TRAINING PROGRAM

## 2025-07-29 PROCEDURE — 99232 SBSQ HOSP IP/OBS MODERATE 35: CPT | Mod: ,,, | Performed by: PHYSICIAN ASSISTANT

## 2025-07-29 PROCEDURE — 63600175 PHARM REV CODE 636 W HCPCS: Mod: JZ,TB | Performed by: NURSE PRACTITIONER

## 2025-07-29 PROCEDURE — 25000003 PHARM REV CODE 250: Performed by: NURSE PRACTITIONER

## 2025-07-29 RX ORDER — PREDNISONE 20 MG/1
40 TABLET ORAL DAILY
Status: DISCONTINUED | OUTPATIENT
Start: 2025-07-30 | End: 2025-07-29 | Stop reason: HOSPADM

## 2025-07-29 RX ORDER — PREDNISONE 10 MG/1
TABLET ORAL
Qty: 193 TABLET | Refills: 0 | Status: SHIPPED | OUTPATIENT
Start: 2025-07-29 | End: 2025-11-04

## 2025-07-29 RX ADMIN — PANTOPRAZOLE SODIUM 40 MG: 40 TABLET, DELAYED RELEASE ORAL at 08:07

## 2025-07-29 RX ADMIN — AMLODIPINE BESYLATE 5 MG: 5 TABLET ORAL at 08:07

## 2025-07-29 RX ADMIN — SODIUM CHLORIDE: 9 INJECTION, SOLUTION INTRAVENOUS at 06:07

## 2025-07-29 RX ADMIN — METHYLPREDNISOLONE SODIUM SUCCINATE 40 MG: 40 INJECTION, POWDER, FOR SOLUTION INTRAMUSCULAR; INTRAVENOUS at 08:07

## 2025-07-29 NOTE — PROGRESS NOTES
[facility] Surg  General Surgery  Progress Note    Subjective:     History of Present Illness:  No notes on file    Post-Op Info:  * No surgery found *         Interval History: having BM. Tolerating CLD. Steroids started yesterday    Medications:  Continuous Infusions:   0.9% NaCl   Intravenous Continuous   Stopped at 07/29/25 0632     Scheduled Meds:   amLODIPine  5 mg Oral Daily    enoxparin  40 mg Subcutaneous Daily    pantoprazole  40 mg Oral Daily    [START ON 7/30/2025] predniSONE  40 mg Oral Daily     PRN Meds:  Current Facility-Administered Medications:     HYDROcodone-acetaminophen, 1 tablet, Oral, Q6H PRN    morphine, 4 mg, Intravenous, Q6H PRN    ondansetron, 4 mg, Intravenous, Q8H PRN    sodium chloride 0.9%, 10 mL, Intravenous, PRN     Review of patient's allergies indicates:   Allergen Reactions    Naproxen Rash     Objective:     Vital Signs (Most Recent):  Temp: 97.6 °F (36.4 °C) (07/29/25 0813)  Pulse: 75 (07/29/25 0813)  Resp: 20 (07/29/25 0813)  BP: 128/66 (07/29/25 0813)  SpO2: 100 % (07/29/25 0813) Vital Signs (24h Range):  Temp:  [97.4 °F (36.3 °C)-98.1 °F (36.7 °C)] 97.6 °F (36.4 °C)  Pulse:  [70-98] 75  Resp:  [16-20] 20  SpO2:  [97 %-100 %] 100 %  BP: (127-147)/(61-80) 128/66     Weight: 93.6 kg (206 lb 6.4 oz)  Body mass index is 30.48 kg/m².    Intake/Output - Last 3 Shifts         07/27 0700 07/28 0659 07/28 0700 07/29 0659 07/29 0700 07/30 0659    I.V. (mL/kg)  1798 (19.2)     IV Piggyback 1000      Total Intake(mL/kg) 1000 (10.7) 1798 (19.2)     Urine (mL/kg/hr)  300 (0.1)     Stool  0     Total Output  300     Net +1000 +1498            Unmeasured Stool Occurrence   0 x             Physical Exam  Constitutional:       Appearance: She is well-developed.   HENT:      Head: Normocephalic and atraumatic.   Eyes:      Conjunctiva/sclera: Conjunctivae normal.      Pupils: Pupils are equal, round, and reactive to light.   Neck:      Thyroid: No thyromegaly.   Cardiovascular:      Rate  and Rhythm: Normal rate and regular rhythm.   Pulmonary:      Effort: Pulmonary effort is normal. No respiratory distress.   Abdominal:      General: There is no distension.      Palpations: Abdomen is soft. There is no mass.      Tenderness: There is no abdominal tenderness.   Musculoskeletal:         General: No tenderness. Normal range of motion.      Cervical back: Normal range of motion.   Skin:     General: Skin is warm and dry.      Capillary Refill: Capillary refill takes less than 2 seconds.   Neurological:      General: No focal deficit present.      Mental Status: She is alert and oriented to person, place, and time.          Significant Labs:  I have reviewed all pertinent lab results within the past 24 hours.  CBC:   Recent Labs   Lab 07/28/25  0347   WBC 5.38   RBC 5.39   HGB 13.5   HCT 44.3   *   MCV 82   MCH 25.0*   MCHC 30.5*     CMP:   Recent Labs   Lab 07/28/25  0347      CALCIUM 9.5   ALBUMIN 3.2*   PROT 7.8      K 4.1   CO2 23      BUN 15   CREATININE 1.0   ALKPHOS 69   ALT <8*   AST 17   BILITOT 0.4       Significant Diagnostics:  I have reviewed all pertinent imaging results/findings within the past 24 hours.  Assessment/Plan:     * Crohn's disease of small intestine with intestinal obstruction  - stress steroids per GI  - can advance to regular diet  - ok to discharge with outpatient GI followup, planning to start remicade   - no need for CRS followup        Mabel Shaver MD  General Surgery  [facility]

## 2025-07-29 NOTE — SUBJECTIVE & OBJECTIVE
Subjective:     Interval History: Patient feeling better. Says she feels like herself again. Tolerated diet. Denies abdominal pain. Had two loose stools after breakfast. Denies blood.     Review of Systems   Constitutional:         See Interval History for daily ROS.      Objective:     Vital Signs (Most Recent):  Temp: 97.6 °F (36.4 °C) (07/29/25 0813)  Pulse: 75 (07/29/25 0813)  Resp: 20 (07/29/25 0813)  BP: 128/66 (07/29/25 0813)  SpO2: 100 % (07/29/25 0813) Vital Signs (24h Range):  Temp:  [97.4 °F (36.3 °C)-98.1 °F (36.7 °C)] 97.6 °F (36.4 °C)  Pulse:  [70-98] 75  Resp:  [16-20] 20  SpO2:  [97 %-100 %] 100 %  BP: (127-147)/(61-80) 128/66     Weight: 93.6 kg (206 lb 6.4 oz) (07/28/25 0330)  Body mass index is 30.48 kg/m².      Intake/Output Summary (Last 24 hours) at 7/29/2025 1101  Last data filed at 7/29/2025 0652  Gross per 24 hour   Intake 1797.98 ml   Output 300 ml   Net 1497.98 ml       Lines/Drains/Airways       Peripheral Intravenous Line  Duration             Peripheral IV Single Lumen 07/29/25 0720 22 G Posterior;Right Forearm <1 day                     Physical Exam  Constitutional:       General: She is not in acute distress.     Appearance: Normal appearance. She is well-developed.   HENT:      Head: Normocephalic and atraumatic.   Eyes:      Extraocular Movements: Extraocular movements intact.   Cardiovascular:      Rate and Rhythm: Normal rate and regular rhythm.   Pulmonary:      Effort: Pulmonary effort is normal. No respiratory distress.      Breath sounds: Normal breath sounds. No wheezing.   Abdominal:      General: Bowel sounds are normal. There is no distension.      Palpations: Abdomen is soft.      Tenderness: There is no abdominal tenderness.   Neurological:      Mental Status: She is alert and oriented to person, place, and time.      Cranial Nerves: No cranial nerve deficit.   Psychiatric:         Behavior: Behavior normal.          Significant Labs:  CBC:   Recent Labs   Lab  07/28/25  0347   WBC 5.38   HGB 13.5   HCT 44.3   *     CMP:   Recent Labs   Lab 07/28/25 0347      CALCIUM 9.5   ALBUMIN 3.2*   PROT 7.8      K 4.1   CO2 23      BUN 15   CREATININE 1.0   ALKPHOS 69   ALT <8*   AST 17   BILITOT 0.4     CRP:   Recent Labs   Lab 07/28/25 0347   .8*         Significant Imaging:  Imaging results within the past 24 hours have been reviewed.

## 2025-07-29 NOTE — ASSESSMENT & PLAN NOTE
- stress steroids per GI  - can advance to regular diet  - ok to discharge with outpatient GI followup, planning to start remicade   - no need for CRS followup

## 2025-07-29 NOTE — PLAN OF CARE
[facility]  Initial Discharge Assessment       Primary Care Provider: Mavis Craig FNP    Admission Diagnosis: Dehydration [E86.0]  Ketonuria [R82.4]  Small bowel obstruction [K56.609]  Upper abdominal pain [R10.10]  Nausea and vomiting, unspecified vomiting type [R11.2]    Admission Date: 7/28/2025  Expected Discharge Date: 7/31/2025    Transition of Care Barriers: Underinsured    Payor: MEDICAID / Plan: [employer] / Product Type: Managed Medicaid /     Extended Emergency Contact Information  Primary Emergency Contact: Janay Gonzales  Address: [address] [place] [address]            [place] [address] [place]  Mobile Phone: [phone]  Relation: Daughter    Discharge Plan A: Home  Discharge Plan B: Home Health      [facility] [address] [address]  Phone: [phone] Fax: [phone]    [id number] [place] [id number] [address]  Phone: [phone] Fax: [phone]      Initial Assessment (most recent)       Adult Discharge Assessment - 07/29/25 1252          Discharge Assessment    Assessment Type Discharge Planning Assessment     Confirmed/corrected address, phone number and insurance Yes     Confirmed Demographics Correct on Facesheet     Source of Information patient;health record     Communicated GEOFFREY with patient/caregiver Date not available/Unable to determine     People in Home alone     Facility Arrived From: Home     Do you expect to return to your current living situation? Yes     Do you have help at home or someone to help you manage your care at home? Yes     Who are your caregiver(s) and their phone number(s)? Family     Prior to hospitilization cognitive status: Alert/Oriented     Current cognitive status: Alert/Oriented     Walking or Climbing Stairs Difficulty no     Dressing/Bathing Difficulty no     Readmission within 30 days?  No     Patient currently being followed by outpatient case management? No     Do you currently have service(s) that help you manage your care at home? No     Do you take prescription medications? Yes     Do you have prescription coverage? Yes     Do you have any problems affording any of your prescribed medications? TBD     Is the patient taking medications as prescribed? yes     Who is going to help you get home at discharge? Daughter     How do you get to doctors appointments? family or friend will provide     Are you on dialysis? No     Do you take coumadin? No     Discharge Plan A Home     Discharge Plan B Home Health     DME Needed Upon Discharge  other (see comments)   TBD    Discharge Plan discussed with: Patient     Transition of Care Barriers Underinsured                   Anticipated DC disposition: home    Prior level of function: independent    PCP: TING Kee    Comments: CM confirmed demographics, emergency contacts, PCP and insurance. Needs will depend on hospital progress; CM following for needs.    Discharge Plan A and Plan B have been determined by review of patient's clinical status, future medical and therapeutic needs, and coverage/benefits for post-acute care in coordination with multidisciplinary team members.     Patient living at a senior independent living facility.

## 2025-07-29 NOTE — PLAN OF CARE
Pt to be discharged to home . Follow ups to be scheduled. PIV and tele monitor removed. Discharge papers given and explained. Meds delivered bedside. Pt has no questions or concerns at this time.

## 2025-07-29 NOTE — PROGRESS NOTES
[facility]  Gastroenterology  Progress Note    Patient Name: Claudia Chris  MRN: [id number]  Admission Date: 7/28/2025  Hospital Length of Stay: 1 days  Code Status: Full Code   Attending Provider: Trevor Shankar MD  Consulting Provider: Donald Santana PA-C  Primary Care Physician: Mavis Craig, JASONP  Principal Problem: Crohn's disease of small intestine with intestinal obstruction        Subjective:     Interval History: Patient feeling better. Says she feels like herself again. Tolerated diet. Denies abdominal pain. Had two loose stools after breakfast. Denies blood.     Review of Systems   Constitutional:         See Interval History for daily ROS.      Objective:     Vital Signs (Most Recent):  Temp: 97.6 °F (36.4 °C) (07/29/25 0813)  Pulse: 75 (07/29/25 0813)  Resp: 20 (07/29/25 0813)  BP: 128/66 (07/29/25 0813)  SpO2: 100 % (07/29/25 0813) Vital Signs (24h Range):  Temp:  [97.4 °F (36.3 °C)-98.1 °F (36.7 °C)] 97.6 °F (36.4 °C)  Pulse:  [70-98] 75  Resp:  [16-20] 20  SpO2:  [97 %-100 %] 100 %  BP: (127-147)/(61-80) 128/66     Weight: 93.6 kg (206 lb 6.4 oz) (07/28/25 0330)  Body mass index is 30.48 kg/m².      Intake/Output Summary (Last 24 hours) at 7/29/2025 1101  Last data filed at 7/29/2025 0652  Gross per 24 hour   Intake 1797.98 ml   Output 300 ml   Net 1497.98 ml       Lines/Drains/Airways       Peripheral Intravenous Line  Duration             Peripheral IV Single Lumen 07/29/25 0720 22 G Posterior;Right Forearm <1 day                     Physical Exam  Constitutional:       General: She is not in acute distress.     Appearance: Normal appearance. She is well-developed.   HENT:      Head: Normocephalic and atraumatic.   Eyes:      Extraocular Movements: Extraocular movements intact.   Cardiovascular:      Rate and Rhythm: Normal rate and regular rhythm.   Pulmonary:      Effort: Pulmonary effort is normal. No respiratory distress.      Breath sounds: Normal breath sounds. No wheezing.    Abdominal:      General: Bowel sounds are normal. There is no distension.      Palpations: Abdomen is soft.      Tenderness: There is no abdominal tenderness.   Neurological:      Mental Status: She is alert and oriented to person, place, and time.      Cranial Nerves: No cranial nerve deficit.   Psychiatric:         Behavior: Behavior normal.          Significant Labs:  CBC:   Recent Labs   Lab 07/28/25  0347   WBC 5.38   HGB 13.5   HCT 44.3   *     CMP:   Recent Labs   Lab 07/28/25  0347      CALCIUM 9.5   ALBUMIN 3.2*   PROT 7.8      K 4.1   CO2 23      BUN 15   CREATININE 1.0   ALKPHOS 69   ALT <8*   AST 17   BILITOT 0.4     CRP:   Recent Labs   Lab 07/28/25  0347   .8*         Significant Imaging:  Imaging results within the past 24 hours have been reviewed.  Assessment/Plan:     GI  * Crohn's disease of small intestine with intestinal obstruction  Patient with untreated Crohn's and possible stricture.   Symptomatically improving after starting IV steroids. Recommend changing to po with long taper as outpatient.   Will need follow up with her outside GI and Surgeon to discuss maintenance therapy. She says these appts are already made.       Thank you for your consult. I will sign off. Please contact us if you have any additional questions.    Donald Santana PA-C  Gastroenterology  [facility]

## 2025-07-29 NOTE — SUBJECTIVE & OBJECTIVE
Interval History: having BM. Tolerating CLD. Steroids started yesterday    Medications:  Continuous Infusions:   0.9% NaCl   Intravenous Continuous   Stopped at 07/29/25 0632     Scheduled Meds:   amLODIPine  5 mg Oral Daily    enoxparin  40 mg Subcutaneous Daily    pantoprazole  40 mg Oral Daily    [START ON 7/30/2025] predniSONE  40 mg Oral Daily     PRN Meds:  Current Facility-Administered Medications:     HYDROcodone-acetaminophen, 1 tablet, Oral, Q6H PRN    morphine, 4 mg, Intravenous, Q6H PRN    ondansetron, 4 mg, Intravenous, Q8H PRN    sodium chloride 0.9%, 10 mL, Intravenous, PRN     Review of patient's allergies indicates:   Allergen Reactions    Naproxen Rash     Objective:     Vital Signs (Most Recent):  Temp: 97.6 °F (36.4 °C) (07/29/25 0813)  Pulse: 75 (07/29/25 0813)  Resp: 20 (07/29/25 0813)  BP: 128/66 (07/29/25 0813)  SpO2: 100 % (07/29/25 0813) Vital Signs (24h Range):  Temp:  [97.4 °F (36.3 °C)-98.1 °F (36.7 °C)] 97.6 °F (36.4 °C)  Pulse:  [70-98] 75  Resp:  [16-20] 20  SpO2:  [97 %-100 %] 100 %  BP: (127-147)/(61-80) 128/66     Weight: 93.6 kg (206 lb 6.4 oz)  Body mass index is 30.48 kg/m².    Intake/Output - Last 3 Shifts         07/27 0700 07/28 0659 07/28 0700 07/29 0659 07/29 0700 07/30 0659    I.V. (mL/kg)  1798 (19.2)     IV Piggyback 1000      Total Intake(mL/kg) 1000 (10.7) 1798 (19.2)     Urine (mL/kg/hr)  300 (0.1)     Stool  0     Total Output  300     Net +1000 +1498            Unmeasured Stool Occurrence   0 x             Physical Exam  Constitutional:       Appearance: She is well-developed.   HENT:      Head: Normocephalic and atraumatic.   Eyes:      Conjunctiva/sclera: Conjunctivae normal.      Pupils: Pupils are equal, round, and reactive to light.   Neck:      Thyroid: No thyromegaly.   Cardiovascular:      Rate and Rhythm: Normal rate and regular rhythm.   Pulmonary:      Effort: Pulmonary effort is normal. No respiratory distress.   Abdominal:      General: There is no  distension.      Palpations: Abdomen is soft. There is no mass.      Tenderness: There is no abdominal tenderness.   Musculoskeletal:         General: No tenderness. Normal range of motion.      Cervical back: Normal range of motion.   Skin:     General: Skin is warm and dry.      Capillary Refill: Capillary refill takes less than 2 seconds.   Neurological:      General: No focal deficit present.      Mental Status: She is alert and oriented to person, place, and time.          Significant Labs:  I have reviewed all pertinent lab results within the past 24 hours.  CBC:   Recent Labs   Lab 07/28/25  0347   WBC 5.38   RBC 5.39   HGB 13.5   HCT 44.3   *   MCV 82   MCH 25.0*   MCHC 30.5*     CMP:   Recent Labs   Lab 07/28/25  0347      CALCIUM 9.5   ALBUMIN 3.2*   PROT 7.8      K 4.1   CO2 23      BUN 15   CREATININE 1.0   ALKPHOS 69   ALT <8*   AST 17   BILITOT 0.4       Significant Diagnostics:  I have reviewed all pertinent imaging results/findings within the past 24 hours.

## 2025-07-29 NOTE — HOSPITAL COURSE
7/29/25  ADELAEON, patient tolerating diet, advanced to regular this AM, denies issues  Discussed with GI and Surgery, stable for discharge home with course of PO steroids  Patient to f/u with established GI and CRC (Dr. Lopez and Dr. Kevin) for further management

## 2025-07-29 NOTE — ASSESSMENT & PLAN NOTE
Patient with untreated Crohn's and possible stricture.   Symptomatically improving after starting IV steroids. Recommend changing to po with long taper as outpatient.   Will need follow up with her outside GI and Surgeon to discuss maintenance therapy. She says these appts are already made.

## 2025-07-29 NOTE — PLAN OF CARE
[facility]  Discharge Final Note    Primary Care Provider: Mavis Craig FNP    Expected Discharge Date: 7/29/2025    Final Discharge Note (most recent)       Final Note - 07/29/25 1303          Final Note    Assessment Type Final Discharge Note     Anticipated Discharge Disposition Home or Self Care     Hospital Resources/Appts/Education Provided Provided patient/caregiver with written discharge plan information        Post-Acute Status    Discharge Delays None known at this time                     Important Message from Medicare             Contact Info       Mavis Craig FNP   Specialty: Family Medicine   Relationship: PCP - General    [address] [place] [id number]   Phone: [phone]       Next Steps: Schedule an appointment as soon as possible for a visit in 1 week(s)    Instructions: Hospital discharge follow up    Ritu Lopez MD   Specialty: Hepatology    [facility]  [address] [place] [address]  [address]  [facility] [address]   Phone: [phone]       Next Steps: Schedule an appointment as soon as possible for a visit in 1 week(s)    Instructions: Hospital discharge followup    Hair Kevin MD   Specialty: Colon and Rectal Surgery    [facility]  [address] [place] [address], [address]  [facility] [address]   Phone: [phone]       Next Steps: Schedule an appointment as soon as possible for a visit in 2 week(s)    Instructions: Hospital discharge follow up          DC Dispo: home    PCP: instructions to follow up with non [facility] PCP on AVS    DME: none ordered    CM reviewed chart; no discharge needs noted.

## 2025-07-29 NOTE — ASSESSMENT & PLAN NOTE
Patient's blood pressure range in the last 24 hours was: BP  Min: 127/61  Max: 158/84.The patient's inpatient anti-hypertensive regimen is listed below:  Current Antihypertensives  amLODIPine tablet 5 mg, Daily, Oral    Plan  - BP is controlled, no changes needed to their regimen

## 2025-07-29 NOTE — NURSING
Patient refusing morning labs. Notified hospital medicine. Patient wishes to advance diet this morning.

## 2025-07-29 NOTE — DISCHARGE SUMMARY
[facility] Medicine  Discharge Summary      Patient Name: Claudia Chris  MRN: [id number]  RICK: [id number]  Patient Class: IP- Inpatient  Admission Date: 7/28/2025  Hospital Length of Stay: 1 days  Discharge Date and Time: 07/29/2025 1:03 PM  Attending Physician: Trevor Shankar MD   Discharging Provider: Trevor Shankar MD  Primary Care Provider: Mavis Craig FNP    Primary Care Team: Networked reference to record PCT     HPI:   Claudia Chris is a 65 year old female who  has a past medical history of GERD (gastroesophageal reflux disease), Crohn's, Hypertension, Personal history of colonic polyps, and Refusal of blood transfusions as patient is Jain who presented to the Emergency Department for evaluation of abdominal pain. Hx of Crohn's. Onset 5  days ago. Associated symptoms include: N/V, generalized weakness and fatigue. Pt states she thought it was her Crohn's and that why she came to the ED. She is currently not on treatment for her Crohn's. She was on Humira but it did not help. Her insurance would not cover Skyrizi. Her GI provider is trying to get infusions covered at home. She is followed by Dr. Lopez at [facility]. She reports having BMs and flatus. Last BM was last night, soft formed. No diarrhea.    In ED, WBC count 5.38, Hgb/Hct 13.5/44.3, Plt 638. CT abdomen/pelvis w/ IV contrast showed redemonstration small bowel obstruction again likely related to patient's underlying Crohn's disease. ED discussed case with GI and General surgery. GI recommended ESR and CRP. Following a comprehensive evaluation of the patient's clinical presentation, vital signs, laboratory results, and risk factors, myself with the attending made  the active decision to admit the patient for further monitoring, diagnostic work-up, and initiation of appropriate treatment, given the potential for clinical deterioration if managed in an outpatient setting.     * No surgery found *       Hospital Course:     7/29/25  NAEON, patient tolerating diet, advanced to regular this AM, denies issues  Discussed with GI and Surgery, stable for discharge home with course of PO steroids  Patient to f/u with established GI and CRC (Dr. Lopez and Dr. Kevin) for further management     Goals of Care Treatment Preferences:  Code Status: Full Code         Consults:   Consults (From admission, onward)          Status Ordering Provider     Inpatient consult to General Surgery  Once        Provider:  Mabel Shaver MD    Completed JOYA PATTEN     Inpatient consult to Gastroenterology  Once        Provider:  Fabby Kamara MD    Completed JOYA PATTEN            Assessment & Plan  Crohn's disease of small intestine with intestinal obstruction  -CT abdomen/pelvis w/ IV contrast showed redemonstration small bowel obstruction again likely related to patient's underlying Crohn's disease   -Pt continues to have BMs and flatus. Last BM last night was soft formed. She denies diarrhea  -GI and General surgery have been consulted  -GI recommended ESR and CRP which are pending  -General surgery recommending steriods and F/U with her primary GI provider. GI aware of recs  -Keep NPO  -IVF  -Antiemetics/Analgesics PRN  -Will advance diet to clears as tolerated  Essential hypertension  Patient's blood pressure range in the last 24 hours was: BP  Min: 127/61  Max: 158/84.The patient's inpatient anti-hypertensive regimen is listed below:  Current Antihypertensives  amLODIPine tablet 5 mg, Daily, Oral    Plan  - BP is controlled, no changes needed to their regimen  GERD (gastroesophageal reflux disease)  -Continue PPI    Small bowel stricture      Final Active Diagnoses:    Diagnosis Date Noted POA    PRINCIPAL PROBLEM:  Crohn's disease of small intestine with intestinal obstruction [K50.012] 05/11/2024 Yes    Small bowel stricture [K56.699] 03/08/2025 Yes    Essential hypertension [I10] 05/11/2024 Yes    GERD  (gastroesophageal reflux disease) [K21.9] 05/11/2024 Yes      Problems Resolved During this Admission:       Discharged Condition: stable    Disposition: Home or Self Care    Follow Up:   Follow-up Information       Mavis Craig FNP. Schedule an appointment as soon as possible for a visit in 1 week(s).    Specialty: Family Medicine  Why: Hospital discharge follow up  Contact information:  [address] [facility] [address]  [phone]               Ritu Lopez MD. Schedule an appointment as soon as possible for a visit in 1 week(s).    Specialty: Hepatology  Why: Hospital discharge followup  Contact information:  [address] [facility]  [address]  [facility] [id number] [phone]               Hair Kevin MD. Schedule an appointment as soon as possible for a visit in 2 week(s).    Specialty: Colon and Rectal Surgery  Why: Hospital discharge follow up  Contact information:  [address], [address]  [facility] [id number] [phone]                           Patient Instructions:   No discharge procedures on file.    Significant Diagnostic Studies: Labs: All labs within the past 24 hours have been reviewed    Pending Diagnostic Studies:       Procedure Component Value Units Date/Time    CBC auto differential [[id number]]     Order Status: Sent Lab Status: No result     Specimen: Blood     Narrative:      The following orders were created for panel order CBC auto differential.  Procedure                               Abnormality         Status                     ---------                               -----------         ------                     CBC with Differential[[id number]]                                                        Please view results for these tests on the individual orders.    CBC with Differential [[id number]]     Order Status: Sent Lab Status: No result     Specimen: Blood     Comprehensive metabolic panel [[id number]]     Order Status: Sent Lab Status: No  result     Specimen: Blood            Medications:  Reconciled Home Medications:      Medication List        START taking these medications      predniSONE 10 MG tablet  Commonly known as: DELTASONE  Take 4 tablets (40 mg total) by mouth once daily for 14 days, THEN 3 tablets (30 mg total) once daily for 21 days, THEN 2 tablets (20 mg total) once daily for 21 days, THEN 1 tablet (10 mg total) once daily for 21 days, THEN 0.5 tablets (5 mg total) once daily for 21 days.  Start taking on: July 29, 2025            CONTINUE taking these medications      amLODIPine 2.5 MG tablet  Commonly known as: NORVASC  Take 2.5 mg by mouth.     ergocalciferol 50,000 unit Cap  Commonly known as: ERGOCALCIFEROL  Take 50,000 Units by mouth every 7 days.     furosemide 20 MG tablet  Commonly known as: LASIX  Take 20 mg by mouth once daily.     HUMIRA(CF) PEN 40 mg/0.4 mL Pnkt  Generic drug: adalimumab  Inject 40 mg into the skin every 14 (fourteen) days.     ondansetron 4 MG tablet  Commonly known as: ZOFRAN  Take 8 mg by mouth 2 (two) times daily.     pantoprazole 40 MG tablet  Commonly known as: PROTONIX  Take 1 tablet (40 mg total) by mouth once daily.              Indwelling Lines/Drains at time of discharge:   Lines/Drains/Airways       None                       Time spent on the discharge of patient: 40 minutes         Trevor Shankar MD  Department of Hospital Medicine  '[facility]